# Patient Record
Sex: FEMALE | Race: BLACK OR AFRICAN AMERICAN
[De-identification: names, ages, dates, MRNs, and addresses within clinical notes are randomized per-mention and may not be internally consistent; named-entity substitution may affect disease eponyms.]

---

## 2018-10-09 ENCOUNTER — HOSPITAL ENCOUNTER (EMERGENCY)
Dept: HOSPITAL 74 - JER | Age: 63
LOS: 1 days | Discharge: HOME | End: 2018-10-10
Payer: COMMERCIAL

## 2018-10-09 VITALS — SYSTOLIC BLOOD PRESSURE: 156 MMHG | HEART RATE: 47 BPM | DIASTOLIC BLOOD PRESSURE: 70 MMHG | TEMPERATURE: 98.7 F

## 2018-10-09 VITALS — BODY MASS INDEX: 29.9 KG/M2

## 2018-10-09 DIAGNOSIS — Z87.891: ICD-10-CM

## 2018-10-09 DIAGNOSIS — M25.511: Primary | ICD-10-CM

## 2018-10-09 DIAGNOSIS — Z79.84: ICD-10-CM

## 2018-10-09 DIAGNOSIS — Z95.5: ICD-10-CM

## 2018-10-09 DIAGNOSIS — I10: ICD-10-CM

## 2018-10-09 DIAGNOSIS — I25.10: ICD-10-CM

## 2018-10-09 DIAGNOSIS — E11.9: ICD-10-CM

## 2018-10-09 LAB
ALBUMIN SERPL-MCNC: 3.9 G/DL (ref 3.4–5)
ALP SERPL-CCNC: 84 U/L (ref 45–117)
ALT SERPL-CCNC: 24 U/L (ref 13–61)
ANION GAP SERPL CALC-SCNC: 6 MMOL/L (ref 8–16)
APPEARANCE UR: CLEAR
AST SERPL-CCNC: 16 U/L (ref 15–37)
BACTERIA #/AREA URNS HPF: (no result) /HPF
BASOPHILS # BLD: 0.9 % (ref 0–2)
BILIRUB SERPL-MCNC: 0.4 MG/DL (ref 0.2–1)
BILIRUB UR STRIP.AUTO-MCNC: NEGATIVE MG/DL
BUN SERPL-MCNC: 25 MG/DL (ref 7–18)
CALCIUM SERPL-MCNC: 10.2 MG/DL (ref 8.5–10.1)
CHLORIDE SERPL-SCNC: 108 MMOL/L (ref 98–107)
CO2 SERPL-SCNC: 28 MMOL/L (ref 21–32)
COLOR UR: COLORLESS
CREAT SERPL-MCNC: 1 MG/DL (ref 0.55–1.3)
DEPRECATED RDW RBC AUTO: 14.7 % (ref 11.6–15.6)
EOSINOPHIL # BLD: 3.3 % (ref 0–4.5)
EPITH CASTS URNS QL MICRO: (no result) /HPF
GLUCOSE SERPL-MCNC: 87 MG/DL (ref 74–106)
HCT VFR BLD CALC: 39.5 % (ref 32.4–45.2)
HGB BLD-MCNC: 12.7 GM/DL (ref 10.7–15.3)
KETONES UR QL STRIP: NEGATIVE
LEUKOCYTE ESTERASE UR QL STRIP.AUTO: (no result)
LYMPHOCYTES # BLD: 50.1 % (ref 8–40)
MCH RBC QN AUTO: 26.1 PG (ref 25.7–33.7)
MCHC RBC AUTO-ENTMCNC: 32 G/DL (ref 32–36)
MCV RBC: 81.7 FL (ref 80–96)
MONOCYTES # BLD AUTO: 7.1 % (ref 3.8–10.2)
NEUTROPHILS # BLD: 38.6 % (ref 42.8–82.8)
NITRITE UR QL STRIP: NEGATIVE
PH UR: 6 [PH] (ref 5–8)
PLATELET # BLD AUTO: 185 K/MM3 (ref 134–434)
PMV BLD: 9.2 FL (ref 7.5–11.1)
POTASSIUM SERPLBLD-SCNC: 3.9 MMOL/L (ref 3.5–5.1)
PROT SERPL-MCNC: 7.4 G/DL (ref 6.4–8.2)
PROT UR QL STRIP: NEGATIVE
PROT UR QL STRIP: NEGATIVE
RBC # BLD AUTO: 4.84 M/MM3 (ref 3.6–5.2)
SODIUM SERPL-SCNC: 143 MMOL/L (ref 136–145)
SP GR UR: 1 (ref 1.01–1.03)
UROBILINOGEN UR STRIP-MCNC: NEGATIVE MG/DL (ref 0.2–1)
WBC # BLD AUTO: 5.5 K/MM3 (ref 4–10)

## 2018-10-09 PROCEDURE — 3E033NZ INTRODUCTION OF ANALGESICS, HYPNOTICS, SEDATIVES INTO PERIPHERAL VEIN, PERCUTANEOUS APPROACH: ICD-10-PCS | Performed by: EMERGENCY MEDICINE

## 2018-10-09 NOTE — PDOC
History of Present Illness





- History of Present Illness


Initial Comments: 





10/09/18 20:21


63 year old woman with past medical history of HTN and DM who presents with a 

sharp intermittent pain on the R shoulder radiating to the chest. She describes 

the pain as like "an ice-pick" and is associated with burning and tingling in 

the R shoulder. 


The patient also reports a history of headache that starts at the back of the 

head that radiates to the bilateral temples and is associated runny nose and 

tearing. The headache will come and go and symptoms of runny nose and tearing 

will resolve once the headache remits. The patient states that she has had 

these headaches in the past and has several headaches like this earlier in the 

day but is now resolved. 


The patient denies any recent travel, shortness of breath, chest pain, fevers, 

nausea, vomiting, diarrhea, constipation or any other complaints at bedside. 


She denies changes in vision, ringing in the ears, numbness or tingling in the 

hands or feet. 











<Lona Nichole - Last Filed: 10/09/18 21:54>





<Pedro Luis Garcia - Last Filed: 10/10/18 00:07>





- General


Chief Complaint: Pain


Stated Complaint: PAIN


Time Seen by Provider: 10/09/18 19:03





Past History





- Past Medical History


COPD: No


Diabetes: Yes


HTN: Yes





- Surgical History


Cardiac Surgery: Yes (stent x 2 2008)





- Suicide/Smoking/Psychosocial Hx


Smoking History: Former smoker


Have you smoked in the past 12 months: No


If you are a former smoker, when did you quit?: 15YRS AGO


Information on smoking cessation initiated: No


Hx Alcohol Use: No


Drug/Substance Use Hx: No


Substance Use Type: None





<Lona Nichole - Last Filed: 10/09/18 21:54>





<Pedro Luis Garcia - Last Filed: 10/10/18 00:07>





- Past Medical History


Allergies/Adverse Reactions: 


 Allergies











Allergy/AdvReac Type Severity Reaction Status Date / Time


 


No Known Drug Allergies Allergy   Verified 10/09/18 18:56











Home Medications: 


Ambulatory Orders





Aspirin [Aspirin EC] 325 mg PO DAILY 10/22/15 


Atenolol [Tenormin] 50 mg PO BID 10/22/15 


Gabapentin [Neurontin -] 300 mg PO Q8H 10/22/15 


Glyburide 5 mg PO BID 10/22/15 


Lisinopril [Prinivil -] 40 mg PO DAILY 10/22/15 


Metformin HCl [Glucophage] 1,000 mg PO BID 10/22/15 


Simvastatin 10 mg PO DAILY 10/22/15 


Cephalexin Monohydrate [Keflex -] 500 mg PO Q6H #7 capsule 10/09/18 











*Physical Exam





- Vital Signs


 Last Vital Signs











Temp Pulse Resp BP Pulse Ox


 


 98.7 F   47 L  18   156/70   96 


 


 10/09/18 18:56  10/09/18 18:56  10/09/18 18:56  10/09/18 18:56  10/09/18 18:56














- Physical Exam


Comments: 





10/09/18 20:38





+ R CVA tenderness 





point tenderness to medial border of R scapula on palpation





no spinal tenderness to palpation











<Lona Nichole - Last Filed: 10/09/18 21:54>





- Vital Signs


 Last Vital Signs











Temp Pulse Resp BP Pulse Ox


 


 98.7 F   47 L  18   156/70   96 


 


 10/09/18 18:56  10/09/18 18:56  10/09/18 18:56  10/09/18 18:56  10/09/18 18:56














<Pedro Luis Garcia - Last Filed: 10/10/18 00:07>





ED Treatment Course





- LABORATORY


CBC & Chemistry Diagram: 


 10/09/18 21:06





 10/09/18 21:06





<Lona Nichole - Last Filed: 10/09/18 21:54>





- LABORATORY


CBC & Chemistry Diagram: 


 10/09/18 21:06





 10/09/18 21:06





- ADDITIONAL ORDERS


Additional order review: 


 Laboratory  Results











  10/09/18 10/09/18 10/09/18





  21:06 21:06 21:06


 


Sodium  143  


 


Potassium  3.9  


 


Chloride  108 H  


 


Carbon Dioxide  28  


 


Anion Gap  6 L  


 


BUN  25 H  


 


Creatinine  1.0  


 


Creat Clearance w eGFR  55.64  


 


Random Glucose  87  


 


Calcium  10.2 H  


 


Total Bilirubin  0.4  


 


AST  16  


 


ALT  24  


 


Alkaline Phosphatase  84  


 


Troponin I    < 0.02


 


Total Protein  7.4  


 


Albumin  3.9  


 


Urine Color   Colorless 


 


Urine Appearance   Clear 


 


Urine pH   6.0 


 


Ur Specific Gravity   1.003 L 


 


Urine Protein   Negative 


 


Urine Glucose (UA)   Negative 


 


Urine Ketones   Negative 


 


Urine Blood   1+ H 


 


Urine Nitrite   Negative 


 


Urine Bilirubin   Negative 


 


Urine Urobilinogen   Negative 


 


Ur Leukocyte Esterase   2+ H 


 


Urine WBC (Auto)   21 


 


Urine RBC (Auto)   None 


 


Ur Epithelial Cells   Rare 


 


Urine Bacteria   Rare 








 











  10/09/18





  21:06


 


RBC  4.84


 


MCV  81.7


 


MCHC  32.0


 


RDW  14.7


 


MPV  9.2


 


Neutrophils %  38.6 L


 


Lymphocytes %  50.1 H


 


Monocytes %  7.1


 


Eosinophils %  3.3


 


Basophils %  0.9














- RADIOLOGY


Radiology Studies Ordered: 














 Category Date Time Status


 


 CHEST PA & LAT [RAD] Stat Radiology  10/09/18 20:27 Taken














- Medications


Given in the ED: 


ED Medications














Discontinued Medications














Generic Name Dose Route Start Last Admin





  Trade Name Harjinder  PRN Reason Stop Dose Admin


 


Acetaminophen  1,000 mg  10/09/18 20:28  10/09/18 21:13





  Ofirmev Injection -  IVPB  10/09/18 20:29  1,000 mg





  ONCE ONE   Administration





     





     





     





     


 


Cephalexin HCl  500 mg  10/09/18 21:41  10/09/18 22:00





  Keflex -  PO  10/09/18 21:42  500 mg





  ONCE ONE   Administration





     





     





     





     














<Pedro Luis Garcia - Last Filed: 10/10/18 00:07>





Medical Decision Making





- Medical Decision Making





10/09/18 21:05


63 year old woman with past medical history of HTN and DM who presents with a 

sharp intermittent pain on the R shoulder radiating to the chest. She describes 

the pain as like "an ice-pick" and is associated with burning and tingling in 

the R shoulder. 


The patient also reports a history of headache that starts at the back of the 

head that radiates to the bilateral temples and is associated runny nose and 

tearing. The headache will come and go and symptoms of runny nose and tearing 

will resolve once the headache remits. The patient states that she has had 

these headaches in the past and has several headaches like this earlier in the 

day but is now resolved. 





DDX including but not limited to: musculoskeletal vs referred cholesysitic pain 

vs pleuritic pain vs radiculopathy





W/U: 


- cbc, cmp


- cxr


- ua, ucx





TX: 


- tylenol





ED Course: 


Patient stable in no acute distress, resting comfortably.





10/09/18 21:54


UA: 21 wbc, 2+ leuk esterase


Keflex 500 given 





10/09/18 21:55


Patient signed out to Dr. Garcia.








<Lona Nichole - Last Filed: 10/09/18 21:54>





*DC/Admit/Observation/Transfer





- Discharge Dispostion


Decision to Admit order: No





<Lona Nichole - Last Filed: 10/09/18 21:54>





<Pedro Luis Garcia - Last Filed: 10/10/18 00:07>


Diagnosis at time of Disposition: 


 Shoulder pain








- Discharge Dispostion


Disposition: HOME


Condition at time of disposition: Stable





- Prescriptions


Prescriptions: 


Cephalexin Monohydrate [Keflex -] 500 mg PO Q6H #7 capsule





- Referrals


Referrals: 


Kathia Galicia [Primary Care Provider] - 


Mahamed Falcon MD [Staff Physician] - 





- Patient Instructions


Additional Instructions: 


You were seen in the ED for complaints of R shoulder/upper back pain.





In the ED you were evaluated with labwork and imaging.


Your results showed a urinary tract infection but no other significant 

findings. 


There does not appear to be an immediate need for hospitalization.





You are advised to follow up with your primary care physician within 1 week.


You were given a referral to orthopedics. Call the number provided to make an 

appointment within 1 week for further evaluation of your neck and shoulder pain.


You were given a prescription for antibiotics to treat your urinary tract 

infection. Please take medications as directed.





Return to the ED immediately if you experience worsening neck and shoulder pain

, nausea, vomiting, loss of consciousness, fever, chest pain, shortness of 

breath or numbness or tingling down the arm.








- Post Discharge Activity

## 2018-10-09 NOTE — PDOC
Attending Attestation





- Resident


Resident Name: Lona Nichole





- ED Attending Attestation


I have performed the following: I have examined & evaluated the patient, The 

case was reviewed & discussed with the resident, I agree w/resident's findings 

& plan, Exceptions are as noted





- HPI


HPI: 





10/09/18 21:03


The patient is a 63 year old female, with a past medical history of DM and HTN, 

who presents to the emergency department via EMS with neck and shoulder pain. 

Pt states that she has had neck pain for approx 1 month. Denies any trauma or 

falls. Pt states that she presents today because she developed pain in her R 

upper back. It is worse with palpation, not pleuritic, not exertional. Pt also 

endorses pain in her R shoulder. Denies CP/SOB. Denies abdominal pain.





She denies any recent palpitations or chest pain. She denies recent fevers, 

chills, lightheadedness or dizziness. She denies recent nausea, vomit, diarrhea 

or constipation. She denies recent  dysuria, frequency, urgency or hematuria. 





Allergies: NKA 


Past surgical history: None reported.


Social history: Former smoker. Denies EtOH use and recreational drug use. 


Primary Care Physician: Dr. Galicia 








- Physicial Exam


PE: 





10/09/18 21:09


GENERAL: Awake, alert, and fully oriented, in no acute distress.


HEAD: No signs of trauma


EYES: PERRLA, EOMI, sclera anicteric, conjunctiva clear


ENT: Auricles normal inspection, hearing grossly normal, nares patent, 

oropharynx clear without exudates. Moist mucosa


NECK: + Mild R paraspinal TTP, no stepoffs, Normal ROM, supple, no 

lymphadenopathy, JVD, or masses


LUNGS: Breath sounds equal, clear to auscultation bilaterally.  No wheezes, and 

no crackles


HEART: Regular rate and rhythm, normal S1 and S2, no murmurs, rubs or gallops


ABDOMEN: Soft, nontender, normoactive bowel sounds.  No guarding, no rebound.  

No masses


EXTREMITIES: Normal range of motion, no edema.  No clubbing or cyanosis. No 

cords, erythema, or tenderness


NEUROLOGICAL: Cranial nerves II through XII intact. 5/5 strength and sensation 

in all extremities, Normal speech, normal gait, normal cerebellar function


SKIN: Warm, Dry, normal turgor, no rashes or lesions noted.


BACK: + Mild TTP over R scapula, no deformity





- Medical Decision Making





10/09/18 21:11


63 F with R upper back and neck pain. Suspect cervical radiculopathy. Pt with 

no neuro deficits to suggest spinal cord injury. Pt with no pleuritic nature of 

pain to suggest lung pathology. Pain is reproducible with palpation, making msk 

pain more likely. Could also be referred pain, but pt with nontender abdomen.


- Labs, trop


- EKG


- CXR


- Pain control





10/10/18 00:06


Labs wnl


CXR clear on my read


UA notable for UTI.





Pt is well appearing, with normal vitals. Clinically stable for DC at this time.


I discussed the physical exam findings, ancillary test results and final 

diagnoses with the patient. I answered all of the patient's questions. The 

patient was satisfied with the care received and felt comfortable with the 

discharge plan and treatment plan.  The patient agrees to follow up with the 

primary care physician within 24-72 hours.

## 2018-10-10 NOTE — EKG
Test Reason : 

Blood Pressure : ***/*** mmHG

Vent. Rate : 043 BPM     Atrial Rate : 043 BPM

   P-R Int : 180 ms          QRS Dur : 088 ms

    QT Int : 462 ms       P-R-T Axes : 049 035 063 degrees

   QTc Int : 390 ms

 

MARKED SINUS BRADYCARDIA

ABNORMAL ECG

WHEN COMPARED WITH ECG OF 26-SEP-2010 12:04,

NO SIGNIFICANT CHANGE WAS FOUND

Confirmed by XAVIER UMAÑA MD ([id number]) on 10/10/2018 9:54:35 AM

 

Referred By:             Confirmed By:XAVIER UMAÑA MD

## 2020-01-30 ENCOUNTER — HOSPITAL ENCOUNTER (EMERGENCY)
Dept: HOSPITAL 74 - JERFT | Age: 65
Discharge: HOME | End: 2020-01-30
Payer: COMMERCIAL

## 2020-01-30 VITALS — DIASTOLIC BLOOD PRESSURE: 61 MMHG | TEMPERATURE: 97.9 F | HEART RATE: 58 BPM | SYSTOLIC BLOOD PRESSURE: 100 MMHG

## 2020-01-30 VITALS — BODY MASS INDEX: 23.3 KG/M2

## 2020-01-30 DIAGNOSIS — G25.81: ICD-10-CM

## 2020-01-30 DIAGNOSIS — M54.32: ICD-10-CM

## 2020-01-30 DIAGNOSIS — E11.9: ICD-10-CM

## 2020-01-30 DIAGNOSIS — Z95.5: ICD-10-CM

## 2020-01-30 DIAGNOSIS — I25.10: Primary | ICD-10-CM

## 2020-01-30 DIAGNOSIS — Z79.84: ICD-10-CM

## 2020-01-30 DIAGNOSIS — Z87.891: ICD-10-CM

## 2020-01-30 DIAGNOSIS — I10: ICD-10-CM

## 2020-01-30 PROCEDURE — 3E0233Z INTRODUCTION OF ANTI-INFLAMMATORY INTO MUSCLE, PERCUTANEOUS APPROACH: ICD-10-PCS | Performed by: STUDENT IN AN ORGANIZED HEALTH CARE EDUCATION/TRAINING PROGRAM

## 2020-01-30 NOTE — PDOC
History of Present Illness





- General


Chief Complaint: Pain


Stated Complaint: SEVER LF HIP PAIN


Time Seen by Provider: 01/30/20 12:14


History Source: Patient


Exam Limitations: No Limitations





Past History





- Past Medical History


Allergies/Adverse Reactions: 


 Allergies











Allergy/AdvReac Type Severity Reaction Status Date / Time


 


No Known Drug Allergies Allergy   Verified 10/09/18 18:56











Home Medications: 


Ambulatory Orders





Aspirin [Aspirin EC] 325 mg PO DAILY 10/22/15 


Atenolol [Tenormin] 25 mg PO BID 10/22/15 


Lisinopril [Prinivil -] 20 mg PO DAILY 10/22/15 


Metformin HCl [Glucophage] 1,000 mg PO BID 10/22/15 


Amlodipine Besylate [Norvasc -] 10 mg PO DAILY 01/30/20 


Atorvastatin Calcium [Lipitor] 20 mg PO HS 01/30/20 


Diclofenac Sodium [Voltaren] 4 gm TP Q6H #2 gel..gram. 01/30/20 


Ergocalciferol (Vitamin D2) [Vitamin D2] 1,000 unit PO DAILY 01/30/20 


Hydrochlorothiazide [Hctz -] 25 mg PO DAILY 01/30/20 


Methocarbamol [Robaxin -] 1,500 mg PO QID #24 tablet 01/30/20 


Oxycodone HCl/Acetaminophen [Percocet 5-325 mg Tablet] 1 tab PO Q6H PRN #12 tab 

MDD 4 01/30/20 


Vitamin B12 100 mg PO DAILY 01/30/20 








COPD: No


Diabetes: Yes


HTN: Yes





- Surgical History


Cardiac Surgery: Yes (stent x 2 2008)





- Psycho Social/Smoking Cessation Hx


Smoking History: Never smoked


Have you smoked in the past 12 months: No


If you are a former smoker, when did you quit?: 15YRS AGO


Hx Alcohol Use: No


Drug/Substance Use Hx: No


Substance Use Type: None





*Physical Exam





- Vital Signs


 Last Vital Signs











Temp Pulse Resp BP Pulse Ox


 


 97.9 F   58 L  20   100/61   99 


 


 01/30/20 12:11  01/30/20 12:11  01/30/20 12:11  01/30/20 12:11  01/30/20 12:11














- Physical Exam


General Appearance: Yes: Mild Distress (due to pain).  No: Apparent Distress


Respiratory/Chest: positive: Lungs Clear, Normal Breath Sounds.  negative: 

Respiratory Distress


Cardiovascular: positive: Regular Rhythm, Regular Rate, S1, S2.  negative: 

Murmur


Gastrointestinal/Abdominal: positive: Soft.  negative: Tender


Musculoskeletal: negative: Vertebral Tenderness


Extremity: positive: Other (able to move LLE, has more pain when in seated 

position)


Neurologic: positive: Alert, Normal Mood/Affect





Medical Decision Making





- Medical Decision Making








65 y/o F hx of HTN, DM, herniated disc, sciatica, restless leg syndrome 

presents with L buttock pain radiating down leg x 4 days, worse today. Last MRI 

of spine was several years ago. 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 

move very heavy patient. Denies fever, sob, cp, abd pain, n/v, back pain, 

urinary sxs, numbness/tingling of extremities, bowel/bladder incontinence, 

saddle/groin paresthesia. 





Likely sciatica


Plan: IM toradol, lido patch, robaxin





01/30/20 12:30





Was still in pain so given Percocet


Advised f/u with PCP





01/30/20 13:37








Discharge





- Discharge Information


Problems reviewed: Yes


Clinical Impression/Diagnosis: 


Sciatica


Qualifiers:


 Laterality: left Qualified Code(s): M54.32 - Sciatica, left side





Condition: Stable


Disposition: HOME





- Admission


No





- Additional Discharge Information


Prescriptions: 


Diclofenac Sodium [Voltaren] 4 gm TP Q6H #2 gel..gram.


Methocarbamol [Robaxin -] 1,500 mg PO QID #24 tablet


Oxycodone HCl/Acetaminophen [Percocet 5-325 mg Tablet] 1 tab PO Q6H PRN #12 tab 

MDD 4


 PRN Reason: Severe Pain


Prescription Drug Monitoring Program (I-STOP) results: I-STOP not reviewed





- Follow up/Referral


Referrals: 


Mariana Catalan MD [Primary Care Provider] - 





- Patient Discharge Instructions


Patient Printed Discharge Instructions:  DI for Sciatica


Additional Instructions: 


Thank you for choosing [facility].  It was a pleasure taking 

care of you.  





Alternate between Tylenol 650 mg every 4 and Motrin 600 mg every 6 hours as 

needed for pain. Be cautious with Motrin as it can affect kidney function. 


Use Robaxin as well. This medication can make you drowsy.


For severe pain, you may take Percocet.  This medication can make you 

constipated for which you may take over the counter Senna tablets as needed.  

This medication can also make you drowsy so please be cautious with driving or 

performing heavy physical work. Please be aware that Percocet contains Tylenol. 

Do not take more than 4000 mg of Tylenol in 1 day.


Use Voltaren gel as directed


Heating pads may also help


Follow-up with your doctor in 2 days





Return to the Emergency Department if your symptoms worsen or persist or have 

chest pain, abdominal pain, vomiting, weakness of extremities, unable to walk, 

unable to control bowel or bladder movements  other concerning symptoms. 





- Post Discharge Activity
3-5x/week

## 2020-07-10 ENCOUNTER — HOSPITAL ENCOUNTER (INPATIENT)
Dept: HOSPITAL 74 - JER | Age: 65
LOS: 8 days | Discharge: HOME HEALTH | DRG: 388 | End: 2020-07-18
Attending: GENERAL ACUTE CARE HOSPITAL | Admitting: INTERNAL MEDICINE
Payer: COMMERCIAL

## 2020-07-10 VITALS — BODY MASS INDEX: 26.9 KG/M2

## 2020-07-10 DIAGNOSIS — I21.4: ICD-10-CM

## 2020-07-10 DIAGNOSIS — I25.10: ICD-10-CM

## 2020-07-10 DIAGNOSIS — E87.1: ICD-10-CM

## 2020-07-10 DIAGNOSIS — E11.9: ICD-10-CM

## 2020-07-10 DIAGNOSIS — Z79.84: ICD-10-CM

## 2020-07-10 DIAGNOSIS — E83.42: ICD-10-CM

## 2020-07-10 DIAGNOSIS — R31.9: ICD-10-CM

## 2020-07-10 DIAGNOSIS — E87.2: ICD-10-CM

## 2020-07-10 DIAGNOSIS — D62: ICD-10-CM

## 2020-07-10 DIAGNOSIS — Z95.5: ICD-10-CM

## 2020-07-10 DIAGNOSIS — G25.81: ICD-10-CM

## 2020-07-10 DIAGNOSIS — M54.30: ICD-10-CM

## 2020-07-10 DIAGNOSIS — E83.39: ICD-10-CM

## 2020-07-10 DIAGNOSIS — K56.50: Primary | ICD-10-CM

## 2020-07-10 DIAGNOSIS — E87.6: ICD-10-CM

## 2020-07-10 DIAGNOSIS — I10: ICD-10-CM

## 2020-07-10 DIAGNOSIS — R80.9: ICD-10-CM

## 2020-07-10 LAB
ALBUMIN SERPL-MCNC: 4.7 G/DL (ref 3.4–5)
ALP SERPL-CCNC: 48 U/L (ref 45–117)
ALT SERPL-CCNC: 19 U/L (ref 13–61)
ANION GAP SERPL CALC-SCNC: 10 MMOL/L (ref 8–16)
APTT BLD: 29.7 SECONDS (ref 25.2–36.5)
AST SERPL-CCNC: 20 U/L (ref 15–37)
BASOPHILS # BLD: 0.5 % (ref 0–2)
BILIRUB SERPL-MCNC: 0.6 MG/DL (ref 0.2–1)
BUN SERPL-MCNC: 16.9 MG/DL (ref 7–18)
CALCIUM SERPL-MCNC: 10.6 MG/DL (ref 8.5–10.1)
CHLORIDE SERPL-SCNC: 89 MMOL/L (ref 98–107)
CO2 SERPL-SCNC: 32 MMOL/L (ref 21–32)
CREAT SERPL-MCNC: 1 MG/DL (ref 0.55–1.3)
DEPRECATED RDW RBC AUTO: 14.1 % (ref 11.6–15.6)
EOSINOPHIL # BLD: 0 % (ref 0–4.5)
GLUCOSE SERPL-MCNC: 208 MG/DL (ref 74–106)
HCT VFR BLD CALC: 41.7 % (ref 32.4–45.2)
HGB BLD-MCNC: 13.6 GM/DL (ref 10.7–15.3)
INR BLD: 1.11 (ref 0.83–1.09)
LIPASE SERPL-CCNC: 90 U/L (ref 73–393)
LYMPHOCYTES # BLD: 16.2 % (ref 8–40)
MAGNESIUM SERPL-MCNC: 1.5 MG/DL (ref 1.8–2.4)
MCH RBC QN AUTO: 26.6 PG (ref 25.7–33.7)
MCHC RBC AUTO-ENTMCNC: 32.6 G/DL (ref 32–36)
MCV RBC: 81.5 FL (ref 80–96)
MONOCYTES # BLD AUTO: 4.3 % (ref 3.8–10.2)
NEUTROPHILS # BLD: 79 % (ref 42.8–82.8)
PLATELET # BLD AUTO: 278 K/MM3 (ref 134–434)
PMV BLD: 8.6 FL (ref 7.5–11.1)
POTASSIUM SERPLBLD-SCNC: 3.9 MMOL/L (ref 3.5–5.1)
PROT SERPL-MCNC: 8.3 G/DL (ref 6.4–8.2)
PT PNL PPP: 13.1 SEC (ref 9.7–13)
RBC # BLD AUTO: 5.11 M/MM3 (ref 3.6–5.2)
SODIUM SERPL-SCNC: 130 MMOL/L (ref 136–145)
WBC # BLD AUTO: 9.6 K/MM3 (ref 4–10)

## 2020-07-10 PROCEDURE — U0003 INFECTIOUS AGENT DETECTION BY NUCLEIC ACID (DNA OR RNA); SEVERE ACUTE RESPIRATORY SYNDROME CORONAVIRUS 2 (SARS-COV-2) (CORONAVIRUS DISEASE [COVID-19]), AMPLIFIED PROBE TECHNIQUE, MAKING USE OF HIGH THROUGHPUT TECHNOLOGIES AS DESCRIBED BY CMS-2020-01-R: HCPCS

## 2020-07-10 NOTE — PDOC
Documentation entered by Remedios Orozco SCRIBE, acting as scribe for Lona Batista MD.








Lona aBtista MD:  This documentation has been prepared by the scribe, 

Remedios Orozco SCRIBE, under my direction and personally reviewed by me in its 

entirety.  I confirm that the documentation accurately reflects all work, 

treatment, procedures, and medical decision making performed by me.  





Attending Attestation





- Resident


Resident Name: Baljinder Martinez





- ED Attending Attestation


I have performed the following: I have examined & evaluated the patient, The 

case was reviewed & discussed with the resident, I agree w/resident's findings &

plan, Exceptions are as noted





- HPI


HPI: 





07/10/20 23:22


Patient is a 65 year old female with a significant past medical history of C 

section, EX-Lap surgery, obstruction, DM and HTN who presents to the ED with 

vomiting and abdominal pain since yesterday. Patient said she has had symptoms 

of nausea and vomiting along with not being able to intake PO. Patient stated 

she has not been passing gas and her ast bowel movement was this morning - 

small.





Allergies: NKDA





PCP: Dr. Mariana Catalan





07/12/20 13:02








- Physicial Exam


PE: 





07/10/20 23:22


General:   Well appearing, awake and alert, NAD. 


HEENT:  NCAT, PERRL, EOMI, clear conjunctiva, anicteric, moist mucus membranes, 

clear oropharynx, no oral lesions.. 


Neck:  neck supple, FROM


Resp:  CTAB, normal and even respirations, no respiratory distress


CVS: RRR, no murmurs, 2+ peripheral pulses throughout, no peripheral edema


Abdomen: +Mid to briseida umbilical and lower abdominal tenderness. +Midline 

vertical laparotomy scar. Soft, NTND, no rebound or guarding. No CVAT.


Back: nontender, normal inspection and ROM


MSK:  no edema, BRIGHT x4, ROM intact. No clubbing or cyanosis. normal bulk and 

tone.


Neuro: alert, oriented appropriately; no focal neurologic deficits


Psych: Calm and cooperative


Skin: warm and well perfused, cap refill <2 sec, normal color





07/12/20 13:02








- Medical Decision Making





07/10/20 23:39


Vital Signs











Temp Pulse Resp BP Pulse Ox


 


 99.9 F H  63   18   184/80 H  97 


 


 07/10/20 20:55  07/10/20 20:55  07/10/20 20:55  07/10/20 20:55  07/10/20 20:55








DDx abdominal pain:  Renal colic, biliary colic, metabolic/electrolyte 

derangements. GERD, PUD, esophageal spasm, pancreatitis, hepatitis, 

constipation, colitis, gastroenteritis, cholecystitis, UTI, pyelonephritis, 

ileus, SBO, medication side effect, hernia, appendicitis, diverticulitis, 

mesenteric ischemia.  msk strain, mesenteric adenitis, psoas abscess.








07/10/20 23:40


Vitals reviewed, low-grade temperature and hypertensive


No tachycardia.  No chest pain or shortness of breath.


ECG is sinus rhythm without ischemic changes, bedside point-of-care ultrasound 

was performed given her abdominal pain, normal gallbladder no stones and no 

evidence of cholecystitis.  Normal kidneys, no hydronephrosis.  However there 

are multiple loops of dilatation, measuring 3 to 4 cm, with to and fro sign, no 

peristalsis and scant free fluid between the bowel walls.  Highly suspicious for

SBO given her history of ex lap and obstruction previously.


CT abdomen pelvis will be obtained to further elucidate.  NG tube, n.p.o., IV 

fluids.


Will need surgical consultation


Basic labs reveals lactic acidosis 2.5, normal creatinine electrolytes.  Will 

hydrate and recheck lactic


Low magnesium, will replete appropriately with 2 g IV mag


Elevated troponin 1.1, no chest pain, ECG is sinus rhythm without ischemic 

changes.  Will need repeat but this is most likely demand ischemia in the s

etting of SBO clinically.


Will warrant admission for medical and surgical management


07/12/20 13:02








**Heart Score/ECG Review


  ** #1


ECG reviewed & interpreted by me at: 21:45


General ECG Interpretation: Sinus Rhythm, Normal Rate, Normal Intervals





07/10/20 23:39


EKG normal sinus rhythm 63 bpm, no interval abnormalities, narrow QRS, ST and T 

wave segments and morphology normal





Discharge





- Discharge Information


Problems reviewed: Yes


Clinical Impression/Diagnosis: 


 Small bowel obstruction, NSTEMI (non-ST elevated myocardial infarction), Lactic

acidosis





Condition: Fair





- Admission


Yes





- Follow up/Referral





- Patient Discharge Instructions





- Post Discharge Activity

## 2020-07-10 NOTE — PDOC
History of Present Illness





- General


Chief Complaint: Pain


Stated Complaint: ABD PAIN/ VOMITTING


Time Seen by Provider: 07/10/20 21:10





- History of Present Illness


Initial Comments: 





07/10/20 22:37


HPI: 


66 y/o with hx of HTN, DM, herniated disc, sciatica, restless leg syndrome, 

exlap for SBO presenting with generalized abd pain since yesterday associated 

with emesis. Pain was sudden and intermittent and felt squeezing. Pain improved 

and then returned with most pain around umbilicus. No rad to flank or back. 

Today she noted worse nausea and had 5episodes of emesis. Was not able to 

tolerate food or liquid. Reports passing gas and stool. No fever, chills, chest 

pain, SOB, palp, dysuria





PMHx: as noted above


ROS: as noted


SHx: Denies tobacco use; no alcohol use; no rec drugs


Allergies: NKDA








ROS: 


GENERAL/CONSTITUTIONAL: No fever or chills. No weakness.


HEAD, EYES, EARS, NOSE AND THROAT: No change in vision. No ear pain or 

discharge. No sore throat.


CARDIOVASCULAR: No chest pain or shortness of breath


RESPIRATORY: No cough, wheezing, or hemoptysis.


GASTROINTESTINAL: +nausea, vomiting; no diarrhea or constipation.


GENITOURINARY: No dysuria, frequency, or change in urination.


MUSCULOSKELETAL: No joint or muscle swelling or pain. No neck or back pain.


SKIN: No rash


NEUROLOGIC: No headache, vertigo, loss of consciousness, or change in 

strength/sensation.


ENDOCRINE: No increased thirst. No abnormal weight change


HEMATOLOGIC/LYMPHATIC: No anemia, easy bleeding, or history of blood clots.


ALLERGIC/IMMUNOLOGIC: No hives or skin allergy.








PE: 


GENERAL: Awake, alert, and fully oriented, no acute distress


HEAD: No signs of trauma, normocephalic, atraumatic 


EYES: EOMI, sclera anicteric, conjunctiva clear


ENT: Auricles normal inspection, hearing grossly normal, nares patent, 

oropharynx clear without exudates. Moist mucosa


NECK: Normal ROM, no lymphadenopathy


LUNGS: No increased work of breathing, symmetrical chest rise, clear to 

auscultation bilaterally, no wheezes, crackles or rhonchi 


HEART: Regular rate, regular rhythm, normal S1 and S2, no murmur, peripheral 

pulses 2+ and equal bilaterally. 


ABDOMEN: Soft, nondistended, periumbilical and RLQ ttp with guarding, no 

rebound. No masses. No CVAT


MUSCULOSKELETAL: FROM


NEUROLOGICAL: Cranial nerves II through XII grossly intact. Normal speech, 

stable gait, no focal sensorimotor deficits 


SKIN: Warm, Dry, normal turgor, no rashes or lesions noted








Past History





- Medical History


Allergies/Adverse Reactions: 


                                    Allergies











Allergy/AdvReac Type Severity Reaction Status Date / Time


 


No Known Drug Allergies Allergy   Verified 07/10/20 20:58











Home Medications: 


Ambulatory Orders





Aspirin [Aspirin EC] 325 mg PO DAILY 10/22/15 


Atenolol [Tenormin] 25 mg PO BID 10/22/15 


Lisinopril [Prinivil -] 20 mg PO DAILY 10/22/15 


Metformin HCl [Glucophage] 500 mg PO BID 10/22/15 


Amlodipine Besylate [Norvasc -] 10 mg PO DAILY 01/30/20 


Atorvastatin Calcium [Lipitor] 40 mg PO HS 01/30/20 


Ergocalciferol (Vitamin D2) [Vitamin D2] 1,000 unit PO DAILY 01/30/20 


Vitamin B12 100 mg PO DAILY 01/30/20 


Glimepiride 1 mg PO DAILY 07/11/20 


Naproxen 500 mg PO Q12H PRN 07/11/20 








COPD: No


Diabetes: Yes


HTN: Yes


Other medical history: herniated discs





- Surgical History


Cardiac Surgery: Yes (stent x 2 2008)





- Psycho-Social/Smoking History


Smoking History: Never smoked


Have you smoked in the past 12 months: No


If you are a former smoker, when did you quit?: 15YRS AGO





- Substance Abuse Hx (Audit-C & DAST Scrn)


How often the patient has a drink containing alcohol: Never


Score: In Men: 4 or > Positive; In Women: 3 or > Positive: 0


Screen Result (Pos requires Nsg. Audit-10AR): Negative





*Physical Exam





- Vital Signs


                                Last Vital Signs











Temp Pulse Resp BP Pulse Ox


 


 99.9 F H  63   18   184/80 H  97 


 


 07/10/20 20:55  07/10/20 20:55  07/10/20 20:55  07/10/20 20:55  07/10/20 20:55














ED Treatment Course





- LABORATORY


CBC & Chemistry Diagram: 


                                 07/10/20 22:23





                                 07/10/20 22:23





- RADIOLOGY


Radiology Studies Ordered: 














 Category Date Time Status


 


 ABDOMEN & PELVIS CT WITH CONTR [CT] Stat CT Scan  07/10/20 21:25 Ordered














- Medications


Given in the ED: 


ED Medications














Discontinued Medications














Generic Name Dose Route Start Last Admin





  Trade Name Harjinder  PRN Reason Stop Dose Admin


 


Al Hydroxide/Mg Hydroxide  30 ml  07/10/20 21:25  07/10/20 22:22





  Mylanta Oral Suspension -  PO  07/10/20 21:26  Not Given





  ONCE ONE  














Medical Decision Making





- Medical Decision Making





07/11/20 05:40


66 y/o with hx of HTN, DM, herniated disc, sciatica, restless leg syndrome, 

exlap for SBO presenting with generalized abd pain since yesterday associated 

with emesis. T 99.9; PE with generalized abd ttp worse in RLQ. DDx includes 

gastritis, GE, appy, diverticulitis, sbo.





-cbc, cmp, coags, card prof, coags, t&s, ekg, cxr


-ct abd pel with contrast


-ivf, ofirmev, pepcid, zofran





07/11/20 05:42


POCUS with dilated loops of bowel while attempting to visualzie gallbladder and 

kidneys greater than 3-4cm concerning for sbo


CT with evidence of SBO


patient remains stable with minimal complaints; given 4mg morphine and placed NG

 tube with 250cc suctioned


lactate 2.5


trop 1.2


MBMD sent to Dr Avalos


admitted to tele under dr grijalva





07/11/20 05:44


patient remains stable with minimal complaints of pain. no nausea or emesis. 

improvement following NG palcement








Discharge





- Discharge Information


Problems reviewed: Yes


Clinical Impression/Diagnosis: 


 Small bowel obstruction, NSTEMI (non-ST elevated myocardial infarction), Lactic

 acidosis





Condition: Fair





- Admission


Yes





- Follow up/Referral





- Patient Discharge Instructions





- Post Discharge Activity

## 2020-07-11 LAB
ALBUMIN SERPL-MCNC: 3.9 G/DL (ref 3.4–5)
ALP SERPL-CCNC: 41 U/L (ref 45–117)
ALT SERPL-CCNC: 15 U/L (ref 13–61)
ANION GAP SERPL CALC-SCNC: 8 MMOL/L (ref 8–16)
APPEARANCE UR: CLEAR
AST SERPL-CCNC: 19 U/L (ref 15–37)
BACTERIA # UR AUTO: 23 /UL (ref 0–1359)
BILIRUB SERPL-MCNC: 0.5 MG/DL (ref 0.2–1)
BILIRUB UR STRIP.AUTO-MCNC: NEGATIVE MG/DL
BUN SERPL-MCNC: 15.5 MG/DL (ref 7–18)
CALCIUM SERPL-MCNC: 9.5 MG/DL (ref 8.5–10.1)
CASTS URNS QL MICRO: 1 /UL (ref 0–3.1)
CHLORIDE SERPL-SCNC: 94 MMOL/L (ref 98–107)
CO2 SERPL-SCNC: 30 MMOL/L (ref 21–32)
COLOR UR: YELLOW
CREAT SERPL-MCNC: 1 MG/DL (ref 0.55–1.3)
DEPRECATED RDW RBC AUTO: 13.9 % (ref 11.6–15.6)
EPITH CASTS URNS QL MICRO: 7 /UL (ref 0–25.1)
GLUCOSE SERPL-MCNC: 165 MG/DL (ref 74–106)
HCT VFR BLD CALC: 38.3 % (ref 32.4–45.2)
HGB BLD-MCNC: 12.3 GM/DL (ref 10.7–15.3)
KETONES UR QL STRIP: NEGATIVE
LEUKOCYTE ESTERASE UR QL STRIP.AUTO: NEGATIVE
MAGNESIUM SERPL-MCNC: 2.1 MG/DL (ref 1.8–2.4)
MCH RBC QN AUTO: 26.4 PG (ref 25.7–33.7)
MCHC RBC AUTO-ENTMCNC: 32.2 G/DL (ref 32–36)
MCV RBC: 81.8 FL (ref 80–96)
NITRITE UR QL STRIP: NEGATIVE
PH UR: 6.5 [PH] (ref 5–8)
PHOSPHATE SERPL-MCNC: 3.5 MG/DL (ref 2.5–4.9)
PLATELET # BLD AUTO: 255 K/MM3 (ref 134–434)
PMV BLD: 8.4 FL (ref 7.5–11.1)
POTASSIUM SERPLBLD-SCNC: 3.9 MMOL/L (ref 3.5–5.1)
PROT SERPL-MCNC: 6.9 G/DL (ref 6.4–8.2)
PROT UR QL STRIP: (no result)
PROT UR QL STRIP: (no result)
RBC # BLD AUTO: 22 /UL (ref 0–23.9)
RBC # BLD AUTO: 4.68 M/MM3 (ref 3.6–5.2)
SODIUM SERPL-SCNC: 132 MMOL/L (ref 136–145)
SP GR UR: 1.02 (ref 1.01–1.03)
UROBILINOGEN UR STRIP-MCNC: 0.2 MG/DL (ref 0.2–1)
WBC # BLD AUTO: 7.7 K/MM3 (ref 4–10)
WBC # UR AUTO: 4 /UL (ref 0–25.8)

## 2020-07-11 PROCEDURE — 0D9670Z DRAINAGE OF STOMACH WITH DRAINAGE DEVICE, VIA NATURAL OR ARTIFICIAL OPENING: ICD-10-PCS | Performed by: GENERAL ACUTE CARE HOSPITAL

## 2020-07-11 RX ADMIN — INSULIN ASPART SCH: 100 INJECTION, SOLUTION INTRAVENOUS; SUBCUTANEOUS at 06:46

## 2020-07-11 RX ADMIN — ATORVASTATIN CALCIUM SCH MG: 40 TABLET, FILM COATED ORAL at 22:40

## 2020-07-11 RX ADMIN — LISINOPRIL SCH MG: 20 TABLET ORAL at 09:54

## 2020-07-11 RX ADMIN — ATENOLOL SCH MG: 25 TABLET ORAL at 09:54

## 2020-07-11 RX ADMIN — INSULIN ASPART SCH: 100 INJECTION, SOLUTION INTRAVENOUS; SUBCUTANEOUS at 22:40

## 2020-07-11 RX ADMIN — VITAM B12 SCH MCG: 100 TAB at 10:09

## 2020-07-11 RX ADMIN — ASPIRIN SCH MG: 81 TABLET, COATED ORAL at 09:54

## 2020-07-11 RX ADMIN — SODIUM CHLORIDE SCH MLS/HR: 9 INJECTION, SOLUTION INTRAVENOUS at 06:11

## 2020-07-11 RX ADMIN — ACETAMINOPHEN PRN MG: 325 TABLET ORAL at 20:19

## 2020-07-11 RX ADMIN — ATENOLOL SCH MG: 25 TABLET ORAL at 22:40

## 2020-07-11 RX ADMIN — SODIUM CHLORIDE SCH MLS/HR: 9 INJECTION, SOLUTION INTRAVENOUS at 16:33

## 2020-07-11 RX ADMIN — VITAMIN D, TAB 1000IU (100/BT) SCH UNIT: 25 TAB at 09:54

## 2020-07-11 RX ADMIN — AMLODIPINE BESYLATE SCH MG: 10 TABLET ORAL at 09:54

## 2020-07-11 RX ADMIN — INSULIN ASPART SCH: 100 INJECTION, SOLUTION INTRAVENOUS; SUBCUTANEOUS at 12:01

## 2020-07-11 RX ADMIN — INSULIN ASPART SCH: 100 INJECTION, SOLUTION INTRAVENOUS; SUBCUTANEOUS at 16:49

## 2020-07-11 NOTE — PDOC
*Physical Exam





- Vital Signs


                                Last Vital Signs











Temp Pulse Resp BP Pulse Ox


 


 99.9 F H  60   20   175/79 H  99 


 


 07/10/20 20:55  07/11/20 00:15  07/11/20 00:15  07/11/20 00:15  07/11/20 00:15














ED Treatment Course





- LABORATORY


CBC & Chemistry Diagram: 


                                 07/10/20 22:23





                                 07/10/20 22:23





- ADDITIONAL ORDERS


Additional order review: 


                               Laboratory  Results











  07/11/20 07/10/20 07/10/20





  00:16 22:23 22:23


 


PT with INR   


 


INR   


 


PTT (Actin FS)   


 


Sodium   


 


Potassium   


 


Chloride   


 


Carbon Dioxide   


 


Anion Gap   


 


BUN   


 


Creatinine   


 


Est GFR (CKD-EPI)AfAm   


 


Est GFR (CKD-EPI)NonAf   


 


Random Glucose   


 


Lactic Acid    2.5 H*


 


Calcium   


 


Magnesium   


 


Total Bilirubin   


 


AST   


 


ALT   


 


Alkaline Phosphatase   


 


Creatine Kinase   


 


Creatine Kinase Index   


 


CK-MB (CK-2)   


 


Troponin I   


 


Total Protein   


 


Albumin   


 


Lipase   


 


Urine Color  Yellow  


 


Urine Appearance  Clear  


 


Urine pH  6.5  


 


Ur Specific Gravity  1.019  


 


Urine Protein  3+ H  


 


Urine Glucose (UA)  Trace  


 


Urine Ketones  Negative  


 


Urine Blood  1+ H  


 


Urine Nitrite  Negative  


 


Urine Bilirubin  Negative  


 


Urine Urobilinogen  0.2  


 


Ur Leukocyte Esterase  Negative  


 


Urine WBC (Auto)  4  


 


Urine RBC (Auto)  22  


 


Urine Casts (Auto)  1  


 


U Epithel Cells (Auto)  7  


 


Urine Bacteria (Auto)  23  


 


Blood Type   O POSITIVE 


 


Antibody Screen   Negative 














  07/10/20 07/10/20





  22:23 22:23


 


PT with INR   13.10 H


 


INR   1.11 H


 


PTT (Actin FS)   29.7


 


Sodium  130 L 


 


Potassium  3.9 


 


Chloride  89 L 


 


Carbon Dioxide  32 


 


Anion Gap  10 


 


BUN  16.9 


 


Creatinine  1.0 


 


Est GFR (CKD-EPI)AfAm  67.51 


 


Est GFR (CKD-EPI)NonAf  58.25 


 


Random Glucose  208 H 


 


Lactic Acid  


 


Calcium  10.6 H 


 


Magnesium  1.5 L 


 


Total Bilirubin  0.6 


 


AST  20 


 


ALT  19 


 


Alkaline Phosphatase  48 


 


Creatine Kinase  159 


 


Creatine Kinase Index  3.2 


 


CK-MB (CK-2)  5.1 H 


 


Troponin I  1.12 H* 


 


Total Protein  8.3 H 


 


Albumin  4.7 


 


Lipase  90 


 


Urine Color  


 


Urine Appearance  


 


Urine pH  


 


Ur Specific Gravity  


 


Urine Protein  


 


Urine Glucose (UA)  


 


Urine Ketones  


 


Urine Blood  


 


Urine Nitrite  


 


Urine Bilirubin  


 


Urine Urobilinogen  


 


Ur Leukocyte Esterase  


 


Urine WBC (Auto)  


 


Urine RBC (Auto)  


 


Urine Casts (Auto)  


 


U Epithel Cells (Auto)  


 


Urine Bacteria (Auto)  


 


Blood Type  


 


Antibody Screen  








                                        











  07/10/20





  22:23


 


RBC  5.11


 


MCV  81.5


 


MCHC  32.6


 


RDW  14.1


 


MPV  8.6


 


Neutrophils %  79.0  D


 


Lymphocytes %  16.2  D


 


Monocytes %  4.3


 


Eosinophils %  0.0  D


 


Basophils %  0.5














- Medications


Given in the ED: 


ED Medications














Discontinued Medications














Generic Name Dose Route Start Last Admin





  Trade Name Harjinder  PRN Reason Stop Dose Admin


 


Acetaminophen  1,000 mg  07/10/20 21:25  07/10/20 22:43





  Ofirmev Injection -  IVPB  07/10/20 21:26  1,000 mg





  ONCE ONE   Administration


 


Al Hydroxide/Mg Hydroxide  30 ml  07/10/20 21:25  07/10/20 22:22





  Mylanta Oral Suspension -  PO  07/10/20 21:26  Not Given





  ONCE ONE  


 


Famotidine/Sodium Chloride  20 mg in 50 mls @ 100 mls/hr  07/10/20 21:25  

07/10/20 22:47





  Pepcid 20 Mg Premixed Ivpb -  IVPB  07/10/20 21:54  100 mls/hr





  ONCE ONE   Administration


 


Sodium Chloride  1,000 mls @ 1,000 mls/hr  07/10/20 21:25  07/10/20 22:43





  Normal Saline -  IV  07/10/20 22:24  1,000 mls/hr





  ASDIR STA   Administration


 


Lactated Ringer's  1,000 ml  07/11/20 00:44  07/11/20 01:03





  Lactated Ringers Solution  IV  07/11/20 00:45  1,000 ml





  ONCE ONE   Administration


 


Magnesium Sulfate  2 gm  07/10/20 23:33  07/11/20 00:14





  Magnesium Sulfate  IVPB  07/10/20 23:34  2 gm





  ONCE ONE   Administration


 


Ondansetron HCl  4 mg  07/10/20 21:37  07/10/20 22:44





  Zofran Injection  IVPUSH  07/10/20 21:38  4 mg





  ONCE ONE   Administration














Medical Decision Making





- Medical Decision Making





07/11/20 01:24


Patient Name: SANG REN


THIS IS A PRELIMINARY REPORT FROM IMAGING ON CALL


DATE OF SERVICE: 2020-07-10 23:40:58


IMAGES: [id number]


EXAM: CT ABDOMEN AND PELVIS WITH CONTRAST


Minimally to moderately dilated small bowel all four quadrants, consistent with 

SBO. Transition


point not identified with certainty, but possibly in RLQ, where there is a small

area of apparent


mesenteric swirling, cannot exclude closed loop small bowel obstruction. No free

air. Appendix not


seen.


Small ascites.


Unremarkable liver, spleen, stomach, pancreas and gallbladder.


Cortical scarring left greater than right kidneys.


5.4 cm RUQ ventral hernia containing fat.


Enlarged mediastinal and left hilar lymph nodes, indeterminate.


07/11/20 06:04


NGT placed and hooked to suction.  She will be admitted to med surg under 

hospitalists, and she will beevaluated by general surgery in the AM.





Discharge





- Discharge Information


Problems reviewed: Yes


Clinical Impression/Diagnosis: 


 Small bowel obstruction, NSTEMI (non-ST elevated myocardial infarction), Lactic

acidosis





Condition: Fair





- Follow up/Referral





- Patient Discharge Instructions





- Post Discharge Activity

## 2020-07-11 NOTE — EKG
Test Reason : 

Blood Pressure : ***/*** mmHG

Vent. Rate : 066 BPM     Atrial Rate : 066 BPM

   P-R Int : 168 ms          QRS Dur : 086 ms

    QT Int : 438 ms       P-R-T Axes : 004 010 042 degrees

   QTc Int : 459 ms

 

NORMAL SINUS RHYTHM

NORMAL ECG

WHEN COMPARED WITH ECG OF 11-JUL-2020 02:19,

NO SIGNIFICANT CHANGE WAS FOUND

Confirmed by RACHAEL TEJEDA MD ([id number]) on 7/11/2020 11:11:02 AM

 

Referred By: MARCIO RITCHIE           Confirmed By:RACHAEL TEJEDA MD

## 2020-07-11 NOTE — EKG
Test Reason : 

Blood Pressure : ***/*** mmHG

Vent. Rate : 063 BPM     Atrial Rate : 063 BPM

   P-R Int : 166 ms          QRS Dur : 076 ms

    QT Int : 456 ms       P-R-T Axes : 023 051 068 degrees

   QTc Int : 466 ms

 

NORMAL SINUS RHYTHM

NORMAL ECG

WHEN COMPARED WITH ECG OF 09-OCT-2018 19:00,

QT HAS LENGTHENED

Confirmed by RACHAEL TEJEDA MD ([id number]) on 7/11/2020 11:11:11 AM

 

Referred By:             Confirmed By:RACHAEL TEJEDA MD

## 2020-07-11 NOTE — CONSULT
- Consultation


REQUESTING PROVIDER: ED MD





CONSULT REQUEST: We have been asked to surgically evaluate this patient for a sm

all bowel obstruction.





PCP:Antonino Marquez MD





HISTORY OF PRESENT ILLNESS:66 y/o A/A female presented w/ nausea/vomiting and 

abdominal pain; she also stated she was not passing flatus. Pain was crampy in 

nature and generalized. She came to the Ed for evaluation. She has had 2 sbo's 

in the past; one managed operatively and one conservatively.





PMHx: NIDDM/HTN/CAD/HLD         





PSHx: JOLLY/ex-lap for SBO.       


                                Home Medications











 Medication  Instructions  Recorded


 


Aspirin [Aspirin EC] 325 mg PO DAILY 10/22/15


 


Atenolol [Tenormin] 25 mg PO BID 10/22/15


 


Lisinopril [Prinivil -] 20 mg PO DAILY 10/22/15


 


Metformin HCl [Glucophage] 500 mg PO BID 10/22/15


 


Amlodipine Besylate [Norvasc -] 10 mg PO DAILY 01/30/20


 


Atorvastatin Calcium [Lipitor] 40 mg PO HS 01/30/20


 


Ergocalciferol (Vitamin D2) 1,000 unit PO DAILY 01/30/20





[Vitamin D2]  


 


Vitamin B12 100 mg PO DAILY 01/30/20


 


Glimepiride 1 mg PO DAILY 07/11/20


 


Naproxen 500 mg PO Q12H PRN 07/11/20








                                    Allergies











Allergy/AdvReac Type Severity Reaction Status Date / Time


 


No Known Drug Allergies Allergy   Verified 07/10/20 20:58








REVIEW OF SYSTEMS:


CONSTITUTIONAL: 


Absent:  fever, chills, diaphoresis, generalized weakness, malaise, loss of 

appetite, weight change


CARDIOVASCULAR: 


Present: chest pain, syncope, palpitations, irregular heart rate, 

lightheadedness, Absent: peripheral edema


RESPIRATORY: 


Absent: cough, shortness of breath, dyspnea with exertion, wheezing, stridor, 

hemoptysis


GASTROINTESTINAL:


Present: abdominal pain, abdominal distension, nausea, vomiting, Absent: 

diarrhea, constipation, melena, hematochezia


GENITOURINARY: 


Absent: dysuria, frequency, urgency, hesitancy, hematuria, flank pain, genital 

pain


MUSCULOSKELETAL: 


Absent: myalgia, arthralgia, joint swelling, back pain, neck pain


SKIN: 


Absent: rash, itching, pallor


HEMATOLOGIC/IMMUNOLOGIC: 


Absent: easy bleeding, easy bruising, lymphadenopathy


NEUROLOGIC: 


Absent: headache, focal weakness, paresthesias, dizziness, unsteady gait, 

seizure, mental status changes, 


bladder or bowel incontinence


PSYCHIATRIC: 


Absent: anxiety, depression, suicidal or homicidal ideation, hallucinations.





PHYSICAL EXAM:


GENERAL: Awake, alert, and fully oriented, in no acute distress.


HEAD: Normal with no signs of trauma.


EYES: PERRL, sclera anicteric, conjunctiva clear.


NECK: Normal ROM, supple without lymphadenopathy, JVD, or masses.


ABDOMEN: Soft, nontender, minimally distended and minimally tympanitic, no 

guarding, no rebound, no masses.  No organomegaly. Incisional hernialikely 

chronicall incarcerated.


MUSCULOSKELETAL: Normal ROM at all joints. No bony deformities or tenderness. No

CVA tenderness.


UPPER EXTREMITIES: 2+ pulses, warm, well-perfused. No cyanosis. Cap refill <2 

seconds. No peripheral edema.


LOWER EXTREMITIES: 2+ pulses, warm, well-perfused. No calf tenderness. No 

peripheral edema. 


NEUROLOGICAL: Normal speech, gait not observed.


PSYCH: Cooperative. Good eye contact. Appropriate mood and affect.


SKIN: Warm, dry, normal turgor, no rashes or lesions noted.


                                   Vital Signs











Temperature  98 F   07/11/20 08:45


 


Pulse Rate  64   07/11/20 08:45


 


Respiratory Rate  18   07/11/20 08:45


 


Blood Pressure  149/71   07/11/20 08:45


 


O2 Sat by Pulse Oximetry (%)  95   07/11/20 05:45








                                   Lab Results











WBC  7.7 K/mm3 (4.0-10.0)   07/11/20  05:35    


 


RBC  4.68 M/mm3 (3.60-5.2)   07/11/20  05:35    


 


Hgb  12.3 GM/dL (10.7-15.3)   07/11/20  05:35    


 


Hct  38.3 % (32.4-45.2)   07/11/20  05:35    


 


MCV  81.8 fl (80-96)   07/11/20  05:35    


 


MCHC  32.2 g/dl (32.0-36.0)   07/11/20  05:35    


 


RDW  13.9 % (11.6-15.6)   07/11/20  05:35    


 


Plt Count  255 K/MM3 (134-434)   07/11/20  05:35    


 


INR  1.11  (0.83-1.09)  H  07/10/20  22:23    


 


Sodium  132 mmol/L (136-145)  L  07/11/20  05:35    


 


Potassium  3.9 mmol/L (3.5-5.1)   07/11/20  05:35    


 


Chloride  94 mmol/L ()  L  07/11/20  05:35    


 


Carbon Dioxide  30 mmol/L (21-32)   07/11/20  05:35    


 


Anion Gap  8 MMOL/L (8-16)   07/11/20  05:35    


 


BUN  15.5 mg/dL (7-18)   07/11/20  05:35    


 


Creatinine  1.0 mg/dL (0.55-1.3)   07/11/20  05:35    


 


Random Glucose  165 mg/dL ()  H  07/11/20  05:35    


 


Calcium  9.5 mg/dL (8.5-10.1)   07/11/20  05:35    


 


Blood Type  O POSITIVE   07/10/20  22:23    


 


Antibody Screen  Negative   07/10/20  22:23    








CT scan a/p reviewed





IMP: recurrent SBO





PLAN: NPO/IVF/NGT/serial exams and abdominal xrays; possible laparotomy if no 

response to conservative tx.





Pedro Luis Avalos MD FACS

## 2020-07-11 NOTE — PN
Progress Note (short form)





- Note


Progress Note: 





65 year old female patient with past medical history that includes HTN, DM, 

herniated disc, sciatica, rerstless leg syndrome, herniated disk, exploratory 

laparatomy, Hx of SBO, who prersented to the ED with nausea, vomiting, and 

abdominal pain.  The patient's symptoms began 3 days ago on Thursday and have 

been getting gradually worse.  She had her last bowel movement yesterday, but 

has not been eating due to her symptoms.  The patient has had a previous SBO 

that resolved with use of a NG tube.  Another SBO in the 1980's was resolved 

using exploratory laparatomy.  The patient also has an extensive cardiac history

that began when she once was running for the bus and felt a large pressure on 

her chest as if someone was choking her.  She went to the doctor and had a 

nuclear stress test done with subsequent insertion of 2 stents in her heart. The

patient denies fever/chills, night sweats, headache, vision changes, chest pain,

shortness of breath, pallpitations, cough, leg swelling, abdominal pain, nausea,

vomiting, diarrhea, constipation, dysuria.


ECG:NSR, q in lat leads





Recent Travel:





PAST MEDICAL HISTORY:


HTN, DM, herniated disc, sciatica, rerstless leg syndrome, herniated disk, Hx of

SBO





PAST SURGICAL HISTORY:


exploratory laparatomy


cardiac cath with 2 stents placed





Social History:


Smoking: denies


Alcohol:denies


Drugs: denies





Allergies





No Known Drug Allergies Allergy (Verified 07/10/20 20:58)


   








HOME MEDICATIONS:


                                Home Medications











 Medication  Instructions  Recorded


 


Aspirin [Aspirin EC] 325 mg PO DAILY 10/22/15


 


Atenolol [Tenormin] 25 mg PO BID 10/22/15


 


Lisinopril [Prinivil -] 20 mg PO DAILY 10/22/15


 


Metformin HCl [Glucophage] 500 mg PO BID 10/22/15


 


Amlodipine Besylate [Norvasc -] 10 mg PO DAILY 01/30/20


 


Atorvastatin Calcium [Lipitor] 40 mg PO HS 01/30/20


 


Ergocalciferol (Vitamin D2) 1,000 unit PO DAILY 01/30/20





[Vitamin D2]  


 


Vitamin B12 100 mg PO DAILY 01/30/20


 


Glimepiride 1 mg PO DAILY 07/11/20


 


Naproxen 500 mg PO Q12H PRN 07/11/20








REVIEW OF SYSTEMS


CONSTITUTIONAL: 


Absent:  fever, chills, diaphoresis


HEENT: 


Absent:  visual changes


CARDIOVASCULAR: 


Absent: chest pain, palpitations, lightheadedness


RESPIRATORY: 


Absent: cough, shortness of breath


GASTROINTESTINAL:


Absent: abdominal pain, nausea, vomiting, diarrhea, constipation


GENITOURINARY: 


Absent: dysuria


NEUROLOGIC: 


Absent: headache, dizziness








PHYSICAL EXAMINATION


                               Vital Signs - 24 hr











  07/10/20 07/11/20 07/11/20





  20:55 00:15 02:33


 


Temperature 99.9 F H  


 


Pulse Rate 63  


 


Pulse Rate [  60 59 L





Apical]   


 


Respiratory 18 20 18





Rate   


 


Blood Pressure 184/80 H  


 


Blood Pressure  175/79 H 136/75





[Left Arm]   


 


O2 Sat by Pulse 97 99 96





Oximetry (%)   














  07/11/20 07/11/20





  03:20 05:45


 


Temperature 98.3 F 


 


Pulse Rate 52 L 


 


Pulse Rate [  





Apical]  


 


Respiratory 16 





Rate  


 


Blood Pressure 138/67 


 


Blood Pressure  





[Left Arm]  


 


O2 Sat by Pulse  95





Oximetry (%)  











GENERAL: Awake, alert, and fully oriented, in no acute distress.


HEAD: Normal with no signs of trauma.


EYES: Extraocular movements intact


EARS, NOSE, THROAT: Ears normal, nares patent, oropharynx clear without e

xudates. Moist mucous membranes.


NECK: Normal range of motion, supple without lymphadenopathy, JVD, or masses.


LUNGS: Breath sounds equal, clear to auscultation bilaterally. No wheezes, and 

no crackles. No accessory muscle use.


HEART: grade 2 systolic murmur at right sternal border.  Regular rate and 

rhythm, normal S1 and S2 without murmur, rub or gallop.


ABDOMEN: Soft, nontender, not distended, hyperactive bowel sounds, no guarding, 

no rebound, no masses. 


MUSCULOSKELETAL: Normal range of motion at all joints. No bony deformities or 

tenderness.


UPPER EXTREMITIES: 2+ pulses, warm, well-perfused. No cyanosis. No clubbing. No 

peripheral edema.


LOWER EXTREMITIES: 2+ pulses, warm, well-perfused. No calf tenderness. No 

peripheral edema. 


NEUROLOGICAL:  Normal speech. Normal gait.


PSYCHIATRIC: Cooperative. Good eye contact. Appropriate mood and affect.


SKIN: Warm, dry, normal turgor, no rashes or lesions noted, normal capillary 

refill. 


                                    CBC, BMP





                                 07/11/20 05:35 





                                 07/11/20 05:35 





                                  Troponin, BNP











  07/10/20 07/11/20 07/11/20





  22:23 02:23 05:35


 


Troponin I  1.12 H*  1.84 H*  1.65 H*








Active Medications





Acetaminophen (Tylenol -)  650 mg PO Q4H PRN


   PRN Reason: PAIN LEVEL 4 - 6


Amlodipine Besylate (Norvasc -)  10 mg PO DAILY [facility]


   Last Admin: 07/11/20 09:54 Dose:  10 mg


   Documented by: 


Aspirin (Ecotrin -)  81 mg PO DAILY [facility]


   Last Admin: 07/11/20 09:54 Dose:  81 mg


   Documented by: 


Atenolol (Tenormin -)  25 mg PO BID [facility]


   Last Admin: 07/11/20 09:54 Dose:  25 mg


   Documented by: 


Atorvastatin Calcium (Lipitor -)  40 mg PO [facility]


Cholecalciferol (Vitamin D3 -)  1,000 unit PO DAILY [facility]


   Last Admin: 07/11/20 09:54 Dose:  1,000 unit


   Documented by: 


Cyanocobalamin (Vitamin B12 -)  100 mcg PO DAILY [facility]


   Last Admin: 07/11/20 10:09 Dose:  100 mcg


   Documented by: 


Heparin Sodium (Porcine) (Heparin -)  1,000 unit IVPUSH PRN PRN


   PRN Reason: Heparin


Heparin Sodium (Porcine) (Heparin -)  5,000 unit IVPUSH PRN PRN


   PRN Reason: Heparin


Sodium Chloride (Normal Saline -)  1,000 mls @ 125 mls/hr IV ASDIR [facility]


   Last Admin: 07/11/20 06:11 Dose:  125 mls/hr


   Documented by: 


HEPARIN SOD,PORK IN 0.45% NACL (Heparin-1/2ns 25,000 Units/500)  25,000 units in

500 mls @ 20 mls/hr IVPB TITR [facility]; Protocol


Insulin Aspart (Novolog Vial Sliding Scale -)  1 vial SQ ACHS [facility]; Protocol


   Last Admin: 07/11/20 06:46 Dose:  Not Given


   Documented by: 


Lisinopril (Prinivil)  20 mg PO DAILY [facility]


   Last Admin: 07/11/20 09:54 Dose:  20 mg


   Documented by: 








ASSESSMENT/PLAN:


65 year old female patient with past medical history that includes HTN, DM, 

herniated disc, sciatica, rerstless leg syndrome, herniated disk, exploratory 

laparatomy, Hx of SBO, who prersented to the ED with nausea, vomiting, and 

abdominal pain.





1. SBO


- SBO found on CT A/P


- NG tube


- IV fluids


- Tylenol prn


- NPO





2. Tropenemia 2/2 demand cardiac ischemia r/o acs


- Troponins positive, but patient reports no chest pain and ecg negative


- Patient may have cardiac ischemia 2/2 fluid loss from vomiting and not staying

well hydrated enough due to the SBO


- f/u ecg


- consult cardio





3. HTN


- Amlodipine, Atenolol





4. DM


- ISS





5. FEN


- IV fluids, NPO, monitor lytes

## 2020-07-11 NOTE — HP
CHIEF COMPLAINT: nausea, vomiting, abdominal pain





PCP:





HISTORY OF PRESENT ILLNESS:


65 year old female patient with past medical history that includes HTN, DM, 

herniated disc, sciatica, rerstless leg syndrome, herniated disk, exploratory 

laparatomy, Hx of SBO, who prersented to the ED with nausea, vomiting, and 

abdominal pain.  The patient's symptoms began 3 days ago on Thursday and have 

been getting gradually worse.  She had her last bowel movement yesterday, but 

has not been eating due to her symptoms.  The patient has had a previous SBO 

that resolved with use of a NG tube.  Another SBO in the 1980's was resolved 

using exploratory laparatomy.  The patient also has an extensive cardiac history

that began when she once was running for the bus and felt a large pressure on 

her chest as if someone was choking her.  She went to the doctor and had a 

nuclear stress test done with subsequent insertion of 2 stents in her heart. The

patient denies fever/chills, night sweats, headache, vision changes, chest pain,

shortness of breath, pallpitations, cough, leg swelling, abdominal pain, nausea,

vomiting, diarrhea, constipation, dysuria.





Recent Travel:





PAST MEDICAL HISTORY:


HTN, DM, herniated disc, sciatica, rerstless leg syndrome, herniated disk, Hx of

SBO





PAST SURGICAL HISTORY:


exploratory laparatomy


cardiac cath with 2 stents placed





Social History:


Smoking: denies


Alcohol:denies


Drugs: denies





Allergies





No Known Drug Allergies Allergy (Verified 07/10/20 20:58)


   








HOME MEDICATIONS:


                                Home Medications











 Medication  Instructions  Recorded


 


Aspirin [Aspirin EC] 325 mg PO DAILY 10/22/15


 


Atenolol [Tenormin] 25 mg PO BID 10/22/15


 


Lisinopril [Prinivil -] 20 mg PO DAILY 10/22/15


 


Metformin HCl [Glucophage] 500 mg PO BID 10/22/15


 


Amlodipine Besylate [Norvasc -] 10 mg PO DAILY 01/30/20


 


Atorvastatin Calcium [Lipitor] 40 mg PO HS 01/30/20


 


Ergocalciferol (Vitamin D2) 1,000 unit PO DAILY 01/30/20





[Vitamin D2]  


 


Vitamin B12 100 mg PO DAILY 01/30/20


 


Glimepiride 1 mg PO DAILY 07/11/20


 


Naproxen 500 mg PO Q12H PRN 07/11/20








REVIEW OF SYSTEMS


CONSTITUTIONAL: 


Absent:  fever, chills, diaphoresis


HEENT: 


Absent:  visual changes


CARDIOVASCULAR: 


Absent: chest pain, palpitations, lightheadedness


RESPIRATORY: 


Absent: cough, shortness of breath


GASTROINTESTINAL:


Absent: abdominal pain, nausea, vomiting, diarrhea, constipation


GENITOURINARY: 


Absent: dysuria


NEUROLOGIC: 


Absent: headache, dizziness








PHYSICAL EXAMINATION


                               Vital Signs - 24 hr











  07/10/20 07/11/20 07/11/20





  20:55 00:15 02:33


 


Temperature 99.9 F H  


 


Pulse Rate 63  


 


Pulse Rate [  60 59 L





Apical]   


 


Respiratory 18 20 18





Rate   


 


Blood Pressure 184/80 H  


 


Blood Pressure  175/79 H 136/75





[Left Arm]   


 


O2 Sat by Pulse 97 99 96





Oximetry (%)   














  07/11/20 07/11/20





  03:20 05:45


 


Temperature 98.3 F 


 


Pulse Rate 52 L 


 


Pulse Rate [  





Apical]  


 


Respiratory 16 





Rate  


 


Blood Pressure 138/67 


 


Blood Pressure  





[Left Arm]  


 


O2 Sat by Pulse  95





Oximetry (%)  











GENERAL: Awake, alert, and fully oriented, in no acute distress.


HEAD: Normal with no signs of trauma.


EYES: Extraocular movements intact


EARS, NOSE, THROAT: Ears normal, nares patent, oropharynx clear without 

exudates. Moist mucous membranes.


NECK: Normal range of motion, supple without lymphadenopathy, JVD, or masses.


LUNGS: Breath sounds equal, clear to auscultation bilaterally. No wheezes, and 

no crackles. No accessory muscle use.


HEART: grade 2 systolic murmur at right sternal border.  Regular rate and 

rhythm, normal S1 and S2 without murmur, rub or gallop.


ABDOMEN: Soft, nontender, not distended, hyperactive bowel sounds, no guarding, 

no rebound, no masses. 


MUSCULOSKELETAL: Normal range of motion at all joints. No bony deformities or 

tenderness.


UPPER EXTREMITIES: 2+ pulses, warm, well-perfused. No cyanosis. No clubbing. No 

peripheral edema.


LOWER EXTREMITIES: 2+ pulses, warm, well-perfused. No calf tenderness. No 

peripheral edema. 


NEUROLOGICAL:  Normal speech. Normal gait.


PSYCHIATRIC: Cooperative. Good eye contact. Appropriate mood and affect.


SKIN: Warm, dry, normal turgor, no rashes or lesions noted, normal capillary 

refill. 


                         Laboratory Results - last 24 hr











  07/10/20 07/10/20 07/10/20





  22:23 22:23 22:23


 


WBC  9.6  


 


RBC  5.11  


 


Hgb  13.6  


 


Hct  41.7  


 


MCV  81.5  


 


MCH  26.6  


 


MCHC  32.6  


 


RDW  14.1  


 


Plt Count  278  D  


 


MPV  8.6  


 


Absolute Neuts (auto)  7.6  


 


Neutrophils %  79.0  D  


 


Lymphocytes %  16.2  D  


 


Monocytes %  4.3  


 


Eosinophils %  0.0  D  


 


Basophils %  0.5  


 


Nucleated RBC %  0  


 


PT with INR   13.10 H 


 


INR   1.11 H 


 


PTT (Actin FS)   29.7 


 


Sodium    130 L


 


Potassium    3.9


 


Chloride    89 L


 


Carbon Dioxide    32


 


Anion Gap    10


 


BUN    16.9


 


Creatinine    1.0


 


Est GFR (CKD-EPI)AfAm    67.51


 


Est GFR (CKD-EPI)NonAf    58.25


 


Random Glucose    208 H


 


Lactic Acid   


 


Calcium    10.6 H


 


Magnesium    1.5 L


 


Total Bilirubin    0.6


 


AST    20


 


ALT    19


 


Alkaline Phosphatase    48


 


Creatine Kinase    159


 


Creatine Kinase Index    3.2


 


CK-MB (CK-2)    5.1 H


 


Troponin I    1.12 H*


 


Total Protein    8.3 H


 


Albumin    4.7


 


Lipase    90


 


Urine Color   


 


Urine Appearance   


 


Urine pH   


 


Ur Specific Gravity   


 


Urine Protein   


 


Urine Glucose (UA)   


 


Urine Ketones   


 


Urine Blood   


 


Urine Nitrite   


 


Urine Bilirubin   


 


Urine Urobilinogen   


 


Ur Leukocyte Esterase   


 


Urine WBC (Auto)   


 


Urine RBC (Auto)   


 


Urine Casts (Auto)   


 


U Epithel Cells (Auto)   


 


Urine Bacteria (Auto)   


 


Blood Type   


 


Antibody Screen   














  07/10/20 07/10/20 07/11/20





  22:23 22:23 00:16


 


WBC   


 


RBC   


 


Hgb   


 


Hct   


 


MCV   


 


MCH   


 


MCHC   


 


RDW   


 


Plt Count   


 


MPV   


 


Absolute Neuts (auto)   


 


Neutrophils %   


 


Lymphocytes %   


 


Monocytes %   


 


Eosinophils %   


 


Basophils %   


 


Nucleated RBC %   


 


PT with INR   


 


INR   


 


PTT (Actin FS)   


 


Sodium   


 


Potassium   


 


Chloride   


 


Carbon Dioxide   


 


Anion Gap   


 


BUN   


 


Creatinine   


 


Est GFR (CKD-EPI)AfAm   


 


Est GFR (CKD-EPI)NonAf   


 


Random Glucose   


 


Lactic Acid  2.5 H*  


 


Calcium   


 


Magnesium   


 


Total Bilirubin   


 


AST   


 


ALT   


 


Alkaline Phosphatase   


 


Creatine Kinase   


 


Creatine Kinase Index   


 


CK-MB (CK-2)   


 


Troponin I   


 


Total Protein   


 


Albumin   


 


Lipase   


 


Urine Color    Yellow


 


Urine Appearance    Clear


 


Urine pH    6.5


 


Ur Specific Gravity    1.019


 


Urine Protein    3+ H


 


Urine Glucose (UA)    Trace


 


Urine Ketones    Negative


 


Urine Blood    1+ H


 


Urine Nitrite    Negative


 


Urine Bilirubin    Negative


 


Urine Urobilinogen    0.2


 


Ur Leukocyte Esterase    Negative


 


Urine WBC (Auto)    4


 


Urine RBC (Auto)    22


 


Urine Casts (Auto)    1


 


U Epithel Cells (Auto)    7


 


Urine Bacteria (Auto)    23


 


Blood Type   O POSITIVE 


 


Antibody Screen   Negative 














  07/11/20 07/11/20





  02:23 02:23


 


WBC  


 


RBC  


 


Hgb  


 


Hct  


 


MCV  


 


MCH  


 


MCHC  


 


RDW  


 


Plt Count  


 


MPV  


 


Absolute Neuts (auto)  


 


Neutrophils %  


 


Lymphocytes %  


 


Monocytes %  


 


Eosinophils %  


 


Basophils %  


 


Nucleated RBC %  


 


PT with INR  


 


INR  


 


PTT (Actin FS)  


 


Sodium  


 


Potassium  


 


Chloride  


 


Carbon Dioxide  


 


Anion Gap  


 


BUN  


 


Creatinine  


 


Est GFR (CKD-EPI)AfAm  


 


Est GFR (CKD-EPI)NonAf  


 


Random Glucose  


 


Lactic Acid  2.7 H* 


 


Calcium  


 


Magnesium  


 


Total Bilirubin  


 


AST  


 


ALT  


 


Alkaline Phosphatase  


 


Creatine Kinase  


 


Creatine Kinase Index  


 


CK-MB (CK-2)  


 


Troponin I   1.84 H*


 


Total Protein  


 


Albumin  


 


Lipase  


 


Urine Color  


 


Urine Appearance  


 


Urine pH  


 


Ur Specific Gravity  


 


Urine Protein  


 


Urine Glucose (UA)  


 


Urine Ketones  


 


Urine Blood  


 


Urine Nitrite  


 


Urine Bilirubin  


 


Urine Urobilinogen  


 


Ur Leukocyte Esterase  


 


Urine WBC (Auto)  


 


Urine RBC (Auto)  


 


Urine Casts (Auto)  


 


U Epithel Cells (Auto)  


 


Urine Bacteria (Auto)  


 


Blood Type  


 


Antibody Screen  











ASSESSMENT/PLAN:


65 year old female patient with past medical history that includes HTN, DM, 

herniated disc, sciatica, rerstless leg syndrome, herniated disk, exploratory 

laparatomy, Hx of SBO, who prersented to the ED with nausea, vomiting, and 

abdominal pain.





1. SBO


- SBO found on CT A/P


- NG tube


- IV fluids


- Tylenol prn


- NPO





2. Tropenemia 2/2 demand cardiac ischemia r/o acs


- Troponins positive, but patient reports no chest pain and ecg negative


- Patient may have cardiac ischemia 2/2 fluid loss from vomiting and not staying

well hydrated enough due to the SBO


- f/u ecg


- consult cardio





3. HTN


- Amlodipine, Atenolol





4. DM


- ISS





5. FEN


- IV fluids, NPO, monitor lytes





DVT Px-SCDs





Dispo - tele, f/u ecg, f/u cardio








Visit type





- Emergency Visit


Emergency Visit: Yes


ED Registration Date: 07/11/20


Care time: The patient presented to the Emergency Department on the above date 

and was hospitalized for further evaluation of their emergent condition.





- New Patient


This patient is new to me today: Yes


Date on this admission: 07/11/20





- Critical Care


Critical Care patient: No





ATTENDING PHYSICIAN STATEMENT





I saw and evaluated the patient.


I reviewed the resident's note and discussed the case with the resident.


I agree with the resident's findings and plan as documented.








SUBJECTIVE:








OBJECTIVE:








ASSESSMENT AND PLAN:

## 2020-07-11 NOTE — PN
Teaching Attending Note


Name of Resident: Pranav Jones





ATTENDING PHYSICIAN STATEMENT





I saw and evaluated the patient.


I reviewed the resident's note and discussed the case with the resident.


I agree with the resident's findings and plan as documented.








SUBJECTIVE: This is a 65 year old woman with a history of HTN, type 2 DM, RLS, 

sciatica, exploratory laparotomy for SBO who comes to the ED complaining of 

abdominal pain, nausea and vomiting. Pain started suddenly 2 days ago. She 

describes it as intermittent and diffuse. Yesterday, she developed vomiting. She

had a small bowel movement yesterday morning. 








OBJECTIVE:


                                   Vital Signs











 Period  Temp  Pulse  Resp  BP Sys/Vasquez  Pulse Ox


 


 Last 24 Hr  99.9 F  59-63  18-20  136-184/75-80  96-99








HEART: S1S2, RRR


LUNGS: Clear


ABDOMEN: Distended, soft, non-tender, hyperactive BS


EXTREMITIES: No edema





                                Laboratory Tests











  07/10/20 07/10/20 07/10/20





  22:23 22:23 22:23


 


WBC  9.6  


 


RBC  5.11  


 


Hgb  13.6  


 


Hct  41.7  


 


MCV  81.5  


 


MCH  26.6  


 


MCHC  32.6  


 


RDW  14.1  


 


Plt Count  278  D  


 


MPV  8.6  


 


Absolute Neuts (auto)  7.6  


 


Neutrophils %  79.0  D  


 


Lymphocytes %  16.2  D  


 


Monocytes %  4.3  


 


Eosinophils %  0.0  D  


 


Basophils %  0.5  


 


Nucleated RBC %  0  


 


PT with INR   13.10 H 


 


INR   1.11 H 


 


PTT (Actin FS)   29.7 


 


Sodium    130 L


 


Potassium    3.9


 


Chloride    89 L


 


Carbon Dioxide    32


 


Anion Gap    10


 


BUN    16.9


 


Creatinine    1.0


 


Est GFR (CKD-EPI)AfAm    67.51


 


Est GFR (CKD-EPI)NonAf    58.25


 


Random Glucose    208 H


 


Lactic Acid   


 


Calcium    10.6 H


 


Magnesium    1.5 L


 


Total Bilirubin    0.6


 


AST    20


 


ALT    19


 


Alkaline Phosphatase    48


 


Creatine Kinase    159


 


Creatine Kinase Index    3.2


 


CK-MB (CK-2)    5.1 H


 


Troponin I    1.12 H*


 


Total Protein    8.3 H


 


Albumin    4.7


 


Lipase    90


 


Urine Color   


 


Urine Appearance   


 


Urine pH   


 


Ur Specific Gravity   


 


Urine Protein   


 


Urine Glucose (UA)   


 


Urine Ketones   


 


Urine Blood   


 


Urine Nitrite   


 


Urine Bilirubin   


 


Urine Urobilinogen   


 


Ur Leukocyte Esterase   


 


Urine WBC (Auto)   


 


Urine RBC (Auto)   


 


Urine Casts (Auto)   


 


U Epithel Cells (Auto)   


 


Urine Bacteria (Auto)   


 


Blood Type   


 


Antibody Screen   














  07/10/20 07/10/20 07/11/20





  22:23 22:23 00:16


 


WBC   


 


RBC   


 


Hgb   


 


Hct   


 


MCV   


 


MCH   


 


MCHC   


 


RDW   


 


Plt Count   


 


MPV   


 


Absolute Neuts (auto)   


 


Neutrophils %   


 


Lymphocytes %   


 


Monocytes %   


 


Eosinophils %   


 


Basophils %   


 


Nucleated RBC %   


 


PT with INR   


 


INR   


 


PTT (Actin FS)   


 


Sodium   


 


Potassium   


 


Chloride   


 


Carbon Dioxide   


 


Anion Gap   


 


BUN   


 


Creatinine   


 


Est GFR (CKD-EPI)AfAm   


 


Est GFR (CKD-EPI)NonAf   


 


Random Glucose   


 


Lactic Acid  2.5 H*  


 


Calcium   


 


Magnesium   


 


Total Bilirubin   


 


AST   


 


ALT   


 


Alkaline Phosphatase   


 


Creatine Kinase   


 


Creatine Kinase Index   


 


CK-MB (CK-2)   


 


Troponin I   


 


Total Protein   


 


Albumin   


 


Lipase   


 


Urine Color    Yellow


 


Urine Appearance    Clear


 


Urine pH    6.5


 


Ur Specific Gravity    1.019


 


Urine Protein    3+ H


 


Urine Glucose (UA)    Trace


 


Urine Ketones    Negative


 


Urine Blood    1+ H


 


Urine Nitrite    Negative


 


Urine Bilirubin    Negative


 


Urine Urobilinogen    0.2


 


Ur Leukocyte Esterase    Negative


 


Urine WBC (Auto)    4


 


Urine RBC (Auto)    22


 


Urine Casts (Auto)    1


 


U Epithel Cells (Auto)    7


 


Urine Bacteria (Auto)    23


 


Blood Type   O POSITIVE 


 


Antibody Screen   Negative 














  07/11/20 07/11/20





  02:23 02:23


 


WBC  


 


RBC  


 


Hgb  


 


Hct  


 


MCV  


 


MCH  


 


MCHC  


 


RDW  


 


Plt Count  


 


MPV  


 


Absolute Neuts (auto)  


 


Neutrophils %  


 


Lymphocytes %  


 


Monocytes %  


 


Eosinophils %  


 


Basophils %  


 


Nucleated RBC %  


 


PT with INR  


 


INR  


 


PTT (Actin FS)  


 


Sodium  


 


Potassium  


 


Chloride  


 


Carbon Dioxide  


 


Anion Gap  


 


BUN  


 


Creatinine  


 


Est GFR (CKD-EPI)AfAm  


 


Est GFR (CKD-EPI)NonAf  


 


Random Glucose  


 


Lactic Acid  2.7 H* 


 


Calcium  


 


Magnesium  


 


Total Bilirubin  


 


AST  


 


ALT  


 


Alkaline Phosphatase  


 


Creatine Kinase  


 


Creatine Kinase Index  


 


CK-MB (CK-2)  


 


Troponin I   1.84 H*


 


Total Protein  


 


Albumin  


 


Lipase  


 


Urine Color  


 


Urine Appearance  


 


Urine pH  


 


Ur Specific Gravity  


 


Urine Protein  


 


Urine Glucose (UA)  


 


Urine Ketones  


 


Urine Blood  


 


Urine Nitrite  


 


Urine Bilirubin  


 


Urine Urobilinogen  


 


Ur Leukocyte Esterase  


 


Urine WBC (Auto)  


 


Urine RBC (Auto)  


 


Urine Casts (Auto)  


 


U Epithel Cells (Auto)  


 


Urine Bacteria (Auto)  


 


Blood Type  


 


Antibody Screen  








                                Home Medications











 Medication  Instructions  Recorded


 


Aspirin [Aspirin EC] 325 mg PO DAILY 10/22/15


 


Atenolol [Tenormin] 25 mg PO BID 10/22/15


 


Lisinopril [Prinivil -] 20 mg PO DAILY 10/22/15


 


Metformin HCl [Glucophage] 500 mg PO BID 10/22/15


 


Amlodipine Besylate [Norvasc -] 10 mg PO DAILY 01/30/20


 


Atorvastatin Calcium [Lipitor] 40 mg PO HS 01/30/20


 


Ergocalciferol (Vitamin D2) 1,000 unit PO DAILY 01/30/20





[Vitamin D2]  


 


Vitamin B12 100 mg PO DAILY 01/30/20














ASSESSMENT AND PLAN:


This is a 65 year old woman with a history of HTN, type 2 DM, RLS, sciatica, 

exploratory laparotomy for SBO who presented to the ED with abdominal pain, 

nausea and vomiting.





1. SBO


   - NG tube inserted in ED with improvement of symptoms


   - Conservative management with NGT, NPO, IV fluid


   - Surgery consult


2. Demand ischemia vs NSTEMI


   - Monitor on telemetry


   - Serial troponins, EKGs


   - Echocardiogram


   - Continue aspirin, atenolol, Lipitor


   - Start Plavix


   - Cardiology consult


3. Lactic acidosis


   - Cause not clear


   - No evidence of sepsis


   - IV fluid


   - Monitor lactic acid


4. Hyponatremia


   - IV fluid


   - Monitor electrolytes


5. Hypomagnesemia


   - Supplement magnesium


6. Proteinuria and hematuria


   - Probable stage 2 CKD/diabetic nephropathy


   - Renal US


   - Nephrology consult

## 2020-07-11 NOTE — PN
Progress Note (short form)





- Note


Progress Note: 


SUBJECTIVE


Seen and examined at bedside.  Patient reports abdominal pain/pressure is 

improved since insertion of the NG tube.  Troponins josefina from 1.1-1.8 last night

and decreased to 1.65.  Heparin drip started due to concern of an STEMI in the 

setting of history of CAD.





OBJECTIVE


                                Last Vital Signs











Temp Pulse Resp BP Pulse Ox


 


 99 F   56 L  20   134/76   96 


 


 07/11/20 14:25  07/11/20 14:25  07/11/20 14:25  07/11/20 14:25  07/11/20 09:00





PE: 


GEN: NAD


HEENT: NC/AT PUJA


RESP: CTAB


CARDS: RRR, -MRG


ABD: NGT in place, mildly distended,


EXT: no swelling/edema


NEURO: Non-focal





[age]-year-old female with a past medical history of hypertension, diabetes, CAD 

status post stents and 2008, multiple episodes of SBO, presents with SBO and 

elevated troponin.





#Small bowel obstruction


Surgery on board: Appreciate recommendations


Continue NG tube


N.p.o. except meds


Trend KUB





#Demand ischemia versus NSTEMI


Started on heparin drip


Cardiology on board: Appreciate recommendations


Hold clopidogrel given concern for possible need for surgery in setting of SBO


Continue aspirin, atenolol, Lipitor,


Telemetry


Troponins down trended





#Elevated lactic acid


Cause unclear, no signs of acute infection.  Possibly secondary to SBO or decrea

sed cardiac output in setting of N STEMI?


Continue fluids


Repeat lactic acid level





#Proteinuria and hematuria


Follow-up renal ultrasound


Repeat UA as an outpatient





#DVT: Heparin drip














Visit type





- Emergency Visit


Emergency Visit: Yes


ED Registration Date: 07/11/20


Care time: The patient presented to the Emergency Department on the above date 

and was hospitalized for further evaluation of their emergent condition.





- New Patient


This patient is new to me today: No





- Critical Care


Critical Care patient: No

## 2020-07-11 NOTE — EKG
Test Reason : 

Blood Pressure : ***/*** mmHG

Vent. Rate : 062 BPM     Atrial Rate : 062 BPM

   P-R Int : 154 ms          QRS Dur : 078 ms

    QT Int : 464 ms       P-R-T Axes : -18 013 043 degrees

   QTc Int : 470 ms

 

*** POOR DATA QUALITY, INTERPRETATION MAY BE ADVERSELY AFFECTED

NORMAL SINUS RHYTHM

NORMAL ECG

WHEN COMPARED WITH ECG OF 10-JUL-2020 21:43,

NO SIGNIFICANT CHANGE WAS FOUND

Confirmed by NIKHIL SESAY, RACHAEL ([id number]) on 7/11/2020 11:35:01 AM

 

Referred By:             Confirmed By:RACHAEL TEJEDA MD

## 2020-07-11 NOTE — CON.CARD
Cardiology Consult (text)





- Consultation


Consultation Note: 





cc: abd pain, vomiting





hpi:  65 f hx htn, dm, cad s/p pci 2008 (after +stress test, no MI), exlap for 

prior sbo, here with abd pain, vomiting.  Sxs for past few days.  No cp sob 

palps dizzy loc pnd orthopnea le edema.  Sees cardio at [facility].





pmh: per hpi


psh: per hpi


social: no tob


fam: no premature cad, scd


ros: per hpi; all others nl


meds:


                                Home Medications











 Medication  Instructions  Recorded


 


Aspirin [Aspirin EC] 325 mg PO DAILY 10/22/15


 


Atenolol [Tenormin] 25 mg PO BID 10/22/15


 


Lisinopril [Prinivil -] 20 mg PO DAILY 10/22/15


 


Metformin HCl [Glucophage] 500 mg PO BID 10/22/15


 


Amlodipine Besylate [Norvasc -] 10 mg PO DAILY 01/30/20


 


Atorvastatin Calcium [Lipitor] 40 mg PO HS 01/30/20


 


Ergocalciferol (Vitamin D2) 1,000 unit PO DAILY 01/30/20





[Vitamin D2]  


 


Vitamin B12 100 mg PO DAILY 01/30/20


 


Glimepiride 1 mg PO DAILY 07/11/20


 


Naproxen 500 mg PO Q12H PRN 07/11/20








                               Current Medications











Generic Name Dose Route Start Last Admin





  Trade Name Freq  PRN Reason Stop Dose Admin


 


Acetaminophen  650 mg  07/11/20 05:14 





  Tylenol -  PO  





  Q4H PRN  





  PAIN LEVEL 4 - 6  


 


Amlodipine Besylate  10 mg  07/11/20 10:00  07/11/20 09:54





  Norvasc -  PO   10 mg





  DAILY JULIA   Administration


 


Aspirin  81 mg  07/11/20 10:00  07/11/20 09:54





  Ecotrin -  PO   81 mg





  DAILY JULIA   Administration


 


Atenolol  25 mg  07/11/20 10:00  07/11/20 09:54





  Tenormin -  PO   25 mg





  BID JULIA   Administration


 


Atorvastatin Calcium  40 mg  07/11/20 22:00 





  Lipitor -  PO  





  [facility]  


 


Cholecalciferol  1,000 unit  07/11/20 10:00  07/11/20 09:54





  Vitamin D3 -  PO   1,000 unit





  DAILY JULIA   Administration


 


Cyanocobalamin  100 mcg  07/11/20 10:00  07/11/20 10:09





  Vitamin B12 -  PO   100 mcg





  DAILY JULIA   Administration


 


Heparin Sodium (Porcine)  1,000 unit  07/11/20 10:24 





  Heparin -  IVPUSH  





  PRN PRN  





  Heparin  


 


Heparin Sodium (Porcine)  5,000 unit  07/11/20 10:24 





  Heparin -  IVPUSH  





  PRN PRN  





  Heparin  


 


Sodium Chloride  1,000 mls @ 125 mls/hr  07/11/20 05:15  07/11/20 06:11





  Normal Saline -  IV   125 mls/hr





  ASDIR JULIA   Administration


 


HEPARIN SOD,PORK IN 0.45% NACL  25,000 units in 500 mls @ 20 mls/hr  07/11/20 

10:30 





  Heparin-1/2ns 25,000 Units/500  IVPB  





  TITR JULIA  





  Protocol  





  1,000 UNITS/HR  


 


Insulin Aspart  1 vial  07/11/20 07:00  07/11/20 06:46





  Novolog Vial Sliding Scale -  SQ   Not Given





  [facility]  





  Protocol  


 


Lisinopril  20 mg  07/11/20 10:00  07/11/20 09:54





  Prinivil  PO   20 mg





  DAILY JULIA   Administration








pe:


                                   Vital Signs











 Period  Temp  Pulse  Resp  BP Sys/Vasquez  Pulse Ox


 


 Last 24 Hr  98 F-99.9 F  52-64  16-20  136-184/67-80  95-99








nad no jvd


rrr s1s2 no mrg


cta bl nl eff


aao3


no le e/c/c


abd nd +mild diffuse tender


pos dp pt no carotid bruits


no jaundice diaphoresis





                             Laboratory Last Values











WBC  7.7 K/mm3 (4.0-10.0)   07/11/20  05:35    


 


RBC  4.68 M/mm3 (3.60-5.2)   07/11/20  05:35    


 


Hgb  12.3 GM/dL (10.7-15.3)   07/11/20  05:35    


 


Hct  38.3 % (32.4-45.2)   07/11/20  05:35    


 


MCV  81.8 fl (80-96)   07/11/20  05:35    


 


MCH  26.4 pg (25.7-33.7)   07/11/20  05:35    


 


MCHC  32.2 g/dl (32.0-36.0)   07/11/20  05:35    


 


RDW  13.9 % (11.6-15.6)   07/11/20  05:35    


 


Plt Count  255 K/MM3 (134-434)   07/11/20  05:35    


 


MPV  8.4 fl (7.5-11.1)   07/11/20  05:35    


 


Absolute Neuts (auto)  7.6 K/mm3 (1.5-8.0)   07/10/20  22:23    


 


Neutrophils %  79.0 % (42.8-82.8)  D 07/10/20  22:23    


 


Lymphocytes %  16.2 % (8-40)  D 07/10/20  22:23    


 


Monocytes %  4.3 % (3.8-10.2)   07/10/20  22:23    


 


Eosinophils %  0.0 % (0-4.5)  D 07/10/20  22:23    


 


Basophils %  0.5 % (0-2.0)   07/10/20  22:23    


 


Nucleated RBC %  0 % (0-0)   07/10/20  22:23    


 


PT with INR  13.10 SEC (9.7-13.0)  H  07/10/20  22:23    


 


INR  1.11  (0.83-1.09)  H  07/10/20  22:23    


 


PTT (Actin FS)  29.7 SECONDS (25.2-36.5)   07/10/20  22:23    


 


Sodium  132 mmol/L (136-145)  L  07/11/20  05:35    


 


Potassium  3.9 mmol/L (3.5-5.1)   07/11/20  05:35    


 


Chloride  94 mmol/L ()  L  07/11/20  05:35    


 


Carbon Dioxide  30 mmol/L (21-32)   07/11/20  05:35    


 


Anion Gap  8 MMOL/L (8-16)   07/11/20  05:35    


 


BUN  15.5 mg/dL (7-18)   07/11/20  05:35    


 


Creatinine  1.0 mg/dL (0.55-1.3)   07/11/20  05:35    


 


Est GFR (CKD-EPI)AfAm  67.51   07/11/20  05:35    


 


Est GFR (CKD-EPI)NonAf  58.25   07/11/20  05:35    


 


Random Glucose  165 mg/dL ()  H  07/11/20  05:35    


 


Lactic Acid  2.6 mmol/L (0.4-2.0)  H*  07/11/20  05:35    


 


Calcium  9.5 mg/dL (8.5-10.1)   07/11/20  05:35    


 


Phosphorus  3.5 mg/dL (2.5-4.9)   07/11/20  05:35    


 


Magnesium  2.1 mg/dL (1.8-2.4)   07/11/20  05:35    


 


Total Bilirubin  0.5 mg/dL (0.2-1)   07/11/20  05:35    


 


AST  19 U/L (15-37)   07/11/20  05:35    


 


ALT  15 U/L (13-61)   07/11/20  05:35    


 


Alkaline Phosphatase  41 U/L ()  L  07/11/20  05:35    


 


Creatine Kinase  159 U/L ()   07/10/20  22:23    


 


Creatine Kinase Index  3.2 % (0.0-5.0)   07/10/20  22:23    


 


CK-MB (CK-2)  5.1 ng/mL (0.5-3.6)  H  07/10/20  22:23    


 


Troponin I  1.65 ng/ml (0.00-0.05)  H*  07/11/20  05:35    


 


Total Protein  6.9 g/dl (6.4-8.2)   07/11/20  05:35    


 


Albumin  3.9 g/dl (3.4-5.0)   07/11/20  05:35    


 


Lipase  90 U/L ()   07/10/20  22:23    


 


Urine Color  Yellow   07/11/20  00:16    


 


Urine Appearance  Clear   07/11/20  00:16    


 


Urine pH  6.5  (5.0-8.0)   07/11/20  00:16    


 


Ur Specific Gravity  1.019  (1.010-1.035)   07/11/20  00:16    


 


Urine Protein  3+  (NEGATIVE)  H  07/11/20  00:16    


 


Urine Glucose (UA)  Trace  (NEGATIVE)   07/11/20  00:16    


 


Urine Ketones  Negative  (NEGATIVE)   07/11/20  00:16    


 


Urine Blood  1+  (NEGATIVE)  H  07/11/20  00:16    


 


Urine Nitrite  Negative  (NEGATIVE)   07/11/20  00:16    


 


Urine Bilirubin  Negative  (NEGATIVE)   07/11/20  00:16    


 


Urine Urobilinogen  0.2 mg/dL (0.2-1.0)   07/11/20  00:16    


 


Ur Leukocyte Esterase  Negative  (NEGATIVE)   07/11/20  00:16    


 


Urine WBC (Auto)  4 /uL (0-25.8)   07/11/20  00:16    


 


Urine RBC (Auto)  22 /uL (0-23.9)   07/11/20  00:16    


 


Urine Casts (Auto)  1 /uL (0-3.1)   07/11/20  00:16    


 


U Epithel Cells (Auto)  7 /uL (0-25.1)   07/11/20  00:16    


 


Urine Bacteria (Auto)  23 /uL (0-1359)   07/11/20  00:16    


 


Blood Type  O POSITIVE   07/10/20  22:23    


 


Antibody Screen  Negative   07/10/20  22:23    








tele: sr





ecg: wnl





cxr: clear 








a/p:  65 f hx htn, dm, cad s/p pci 2008 (after +stress test, no MI), exlap for 

prior sbo, here with abd pain, vomiting.





cad s/p remote pci, now with +trops/nstemi:


-pt has no cardiac sxs but trop mildly elevated, peaked at 1.8.  ECG 

unremarkable.  Possible demand ischemia from acute issues but given cad hx 

possibly nstemi as well.  Agree with hep gtt for 48-72 hrs (started 7/11 AM).  

Cont asa, statin, bb, ace.  Would hold off on plavix given her current sbo in 

case urgent surgery becomes necessary.


-check echo, cont tele


-will need ischemic eval, most likely with nuclear stress test, when acute 

issues resolved





htn:


-cont current meds





hld:


-cont statin





sbo:


-surgery following, attempting conservative management for now

## 2020-07-12 LAB
ANION GAP SERPL CALC-SCNC: 8 MMOL/L (ref 8–16)
BASOPHILS # BLD: 1.2 % (ref 0–2)
BUN SERPL-MCNC: 12.6 MG/DL (ref 7–18)
CALCIUM SERPL-MCNC: 9.1 MG/DL (ref 8.5–10.1)
CHLORIDE SERPL-SCNC: 105 MMOL/L (ref 98–107)
CO2 SERPL-SCNC: 27 MMOL/L (ref 21–32)
CREAT SERPL-MCNC: 0.8 MG/DL (ref 0.55–1.3)
DEPRECATED RDW RBC AUTO: 13.8 % (ref 11.6–15.6)
DEPRECATED RDW RBC AUTO: 14 % (ref 11.6–15.6)
EOSINOPHIL # BLD: 2.2 % (ref 0–4.5)
GLUCOSE SERPL-MCNC: 118 MG/DL (ref 74–106)
HCT VFR BLD CALC: 35.2 % (ref 32.4–45.2)
HCT VFR BLD CALC: 37.7 % (ref 32.4–45.2)
HGB BLD-MCNC: 11.3 GM/DL (ref 10.7–15.3)
HGB BLD-MCNC: 12.1 GM/DL (ref 10.7–15.3)
LYMPHOCYTES # BLD: 31.1 % (ref 8–40)
MCH RBC QN AUTO: 26.4 PG (ref 25.7–33.7)
MCH RBC QN AUTO: 26.5 PG (ref 25.7–33.7)
MCHC RBC AUTO-ENTMCNC: 32.1 G/DL (ref 32–36)
MCHC RBC AUTO-ENTMCNC: 32.2 G/DL (ref 32–36)
MCV RBC: 82.2 FL (ref 80–96)
MCV RBC: 82.4 FL (ref 80–96)
MONOCYTES # BLD AUTO: 9.5 % (ref 3.8–10.2)
NEUTROPHILS # BLD: 56 % (ref 42.8–82.8)
PLATELET # BLD AUTO: 213 K/MM3 (ref 134–434)
PLATELET # BLD AUTO: 229 K/MM3 (ref 134–434)
PMV BLD: 7.8 FL (ref 7.5–11.1)
PMV BLD: 8.6 FL (ref 7.5–11.1)
POTASSIUM SERPLBLD-SCNC: 3.4 MMOL/L (ref 3.5–5.1)
RBC # BLD AUTO: 4.29 M/MM3 (ref 3.6–5.2)
RBC # BLD AUTO: 4.57 M/MM3 (ref 3.6–5.2)
SODIUM SERPL-SCNC: 141 MMOL/L (ref 136–145)
WBC # BLD AUTO: 5.7 K/MM3 (ref 4–10)
WBC # BLD AUTO: 6.8 K/MM3 (ref 4–10)

## 2020-07-12 RX ADMIN — INSULIN ASPART SCH: 100 INJECTION, SOLUTION INTRAVENOUS; SUBCUTANEOUS at 17:14

## 2020-07-12 RX ADMIN — INSULIN ASPART SCH: 100 INJECTION, SOLUTION INTRAVENOUS; SUBCUTANEOUS at 12:27

## 2020-07-12 RX ADMIN — POTASSIUM CHLORIDE SCH MLS/HR: 7.46 INJECTION, SOLUTION INTRAVENOUS at 14:20

## 2020-07-12 RX ADMIN — ACETAMINOPHEN PRN MG: 325 TABLET ORAL at 18:17

## 2020-07-12 RX ADMIN — SODIUM CHLORIDE SCH MLS/HR: 9 INJECTION, SOLUTION INTRAVENOUS at 00:28

## 2020-07-12 RX ADMIN — SODIUM CHLORIDE SCH MLS/HR: 9 INJECTION, SOLUTION INTRAVENOUS at 06:34

## 2020-07-12 RX ADMIN — ATORVASTATIN CALCIUM SCH MG: 40 TABLET, FILM COATED ORAL at 22:41

## 2020-07-12 RX ADMIN — LISINOPRIL SCH MG: 20 TABLET ORAL at 11:00

## 2020-07-12 RX ADMIN — AMLODIPINE BESYLATE SCH MG: 10 TABLET ORAL at 11:00

## 2020-07-12 RX ADMIN — POTASSIUM CHLORIDE SCH MLS/HR: 7.46 INJECTION, SOLUTION INTRAVENOUS at 13:05

## 2020-07-12 RX ADMIN — INSULIN ASPART SCH: 100 INJECTION, SOLUTION INTRAVENOUS; SUBCUTANEOUS at 06:12

## 2020-07-12 RX ADMIN — POTASSIUM CHLORIDE SCH MLS/HR: 7.46 INJECTION, SOLUTION INTRAVENOUS at 10:58

## 2020-07-12 RX ADMIN — ATENOLOL SCH MG: 25 TABLET ORAL at 23:18

## 2020-07-12 RX ADMIN — VITAMIN D, TAB 1000IU (100/BT) SCH UNIT: 25 TAB at 11:00

## 2020-07-12 RX ADMIN — INSULIN ASPART SCH: 100 INJECTION, SOLUTION INTRAVENOUS; SUBCUTANEOUS at 22:03

## 2020-07-12 RX ADMIN — ASPIRIN SCH: 81 TABLET, COATED ORAL at 10:55

## 2020-07-12 RX ADMIN — PANTOPRAZOLE SODIUM SCH MG: 40 INJECTION, POWDER, FOR SOLUTION INTRAVENOUS at 22:17

## 2020-07-12 RX ADMIN — VITAM B12 SCH MCG: 100 TAB at 11:24

## 2020-07-12 RX ADMIN — ATENOLOL SCH MG: 25 TABLET ORAL at 11:00

## 2020-07-12 RX ADMIN — ATENOLOL SCH: 25 TABLET ORAL at 22:50

## 2020-07-12 NOTE — PN
Progress Note (short form)





- Note


Progress Note: 


s: no cp sob palps dizzy





                               Current Medications











Generic Name Dose Route Start Last Admin





  Trade Name Freq  PRN Reason Stop Dose Admin


 


Acetaminophen  650 mg  07/11/20 05:14  07/11/20 20:19





  Tylenol -  PO   650 mg





  Q4H PRN   Administration





  PAIN LEVEL 4 - 6  


 


Amlodipine Besylate  10 mg  07/11/20 10:00  07/11/20 09:54





  Norvasc -  PO   10 mg





  DAILY JULIA   Administration


 


Aspirin  81 mg  07/11/20 10:00  07/11/20 09:54





  Ecotrin -  PO   81 mg





  DAILY JULIA   Administration


 


Atenolol  25 mg  07/11/20 10:00  07/11/20 22:40





  Tenormin -  PO   25 mg





  BID JULIA   Administration


 


Atorvastatin Calcium  40 mg  07/11/20 22:00  07/11/20 22:40





  Lipitor -  PO   40 mg





  HS JULIA   Administration


 


Cholecalciferol  1,000 unit  07/11/20 10:00  07/11/20 09:54





  Vitamin D3 -  PO   1,000 unit





  DAILY JULIA   Administration


 


Cyanocobalamin  100 mcg  07/11/20 10:00  07/11/20 10:09





  Vitamin B12 -  PO   100 mcg





  DAILY JULIA   Administration


 


Sodium Chloride  1,000 mls @ 125 mls/hr  07/11/20 05:15  07/12/20 06:34





  Normal Saline -  IV   125 mls/hr





  ASDIR JULIA   Administration


 


Potassium Chloride  10 meq in 100 mls @ 100 mls/hr  07/12/20 08:30 





  Potassium Chloride 10 Meq Premix Ivpb -  IVPB  07/12/20 11:29 





  Q60M Counts include 234 beds at the [facility]  


 


Insulin Aspart  1 vial  07/11/20 07:00  07/12/20 06:12





  Novolog Vial Sliding Scale -  SQ   Not Given





  [facility] Counts include 234 beds at the [facility]  





  Protocol  


 


Lisinopril  20 mg  07/11/20 10:00  07/11/20 09:54





  Prinivil  PO   20 mg





  DAILY JULIA   Administration











pe:


                                  Vital Signs











 Period  Temp  Pulse  Resp  BP Sys/Vasquez  Pulse Ox


 


 Last 24 Hr  97.2 F-99.6 F  48-60  16-20  133-149/51-76  96








nad no jvd


rrr s1s2 no mrg


cta bl nl eff


aao3


no le e/c/c


abd nd nt


no jaundice diaphoresis





                                    CBC, BMP





                                 07/12/20 05:40 





                                 07/12/20 05:40 








tele: sr





ecg: wnl





cxr: clear 








a/p:  65 f hx htn, dm, cad s/p pci 2008 (after +stress test, no MI), exlap for 

prior sbo, here with abd pain, vomiting.





cad s/p remote pci, now with +trops/nstemi:


-pt has no cardiac sxs but trop mildly elevated, peaked at 1.8.  ECG 

unremarkable.  Possible demand ischemia from acute issues but given cad hx 

possibly nstemi as well.  Started hep gtt 7/11 but then had coffee ground fluids

in ngt so hep gtt stopped.   Cont statin, bb, ace. 


-check echo, cont tele


-will need ischemic eval, most likely with nuclear stress test, when acute 

issues resolved





htn:


-cont current meds





hld:


-cont statin





sbo:


-surgery following, attempting conservative management for now

## 2020-07-12 NOTE — PN
Progress Note (short form)





- Note


Progress Note: 





Attending Surgeon





Seen in f/u; states she passed a small amount of flatus; no BM; NGT came out 

yesterday and was replaced; on heparin gtt.





VSS AF





abdo-soft; nontender; minimally distended and tympanitic





NGT-600 cc





AXR's reviewed





IMP: sbo





PLAN: Continue NPO/NGT/no films for today; will repeat 07/13/2020; NGT care d/w 

the patients nurse and orders placed.





Pedro Luis Avalos MD FACS

## 2020-07-12 NOTE — PN
Physical Exam: 


SUBJECTIVE: Patient seen and examined at bedside. Coffee ground emesis 

overnight. Heparin drip held. 








OBJECTIVE:





                                   Vital Signs











 Period  Temp  Pulse  Resp  BP Sys/Vasquez  Pulse Ox


 


 Last 24 Hr  97.2 F-99.7 F  48-60  16-20  133-156/51-75  96-98











GENERAL: NAD NGT in place.


HEAD: Normal with no signs of trauma.


EYES: EOMI Sclera Clear


LUNGS: Clear


HEART: RRR S1S2 No MRG


ABDOMEN: Ex-LAP Surgical Scar. Distended. BS appreciated. No guarding or 

rigidity. 


EXTREMITIES: No Sig Edema appreciated 


PSYCH: Normal mood, normal affect.

















                         Laboratory Results - last 24 hr











  07/11/20 07/11/20 07/11/20





  15:20 16:38 17:37


 


WBC   


 


RBC   


 


Hgb   


 


Hct   


 


MCV   


 


MCH   


 


MCHC   


 


RDW   


 


Plt Count   


 


MPV   


 


Absolute Neuts (auto)   


 


Neutrophils %   


 


Lymphocytes %   


 


Monocytes %   


 


Eosinophils %   


 


Basophils %   


 


Nucleated RBC %   


 


PTT (Actin FS)    107.3 H


 


Sodium   


 


Potassium   


 


Chloride   


 


Carbon Dioxide   


 


Anion Gap   


 


BUN   


 


Creatinine   


 


Est GFR (CKD-EPI)AfAm   


 


Est GFR (CKD-EPI)NonAf   


 


POC Glucometer   143 


 


Random Glucose   


 


Lactic Acid   


 


Calcium   


 


Troponin I  1.30 H*  


 


Gastric Occult Blood   














  07/11/20 07/11/20 07/11/20





  17:37 21:53 23:15


 


WBC   


 


RBC   


 


Hgb   


 


Hct   


 


MCV   


 


MCH   


 


MCHC   


 


RDW   


 


Plt Count   


 


MPV   


 


Absolute Neuts (auto)   


 


Neutrophils %   


 


Lymphocytes %   


 


Monocytes %   


 


Eosinophils %   


 


Basophils %   


 


Nucleated RBC %   


 


PTT (Actin FS)   


 


Sodium   


 


Potassium   


 


Chloride   


 


Carbon Dioxide   


 


Anion Gap   


 


BUN   


 


Creatinine   


 


Est GFR (CKD-EPI)AfAm   


 


Est GFR (CKD-EPI)NonAf   


 


POC Glucometer   


 


Random Glucose   


 


Lactic Acid  1.6  


 


Calcium   


 


Troponin I   Cancelled  1.14 H*


 


Gastric Occult Blood   














  07/12/20 07/12/20 07/12/20





  01:30 05:40 05:40


 


WBC   5.7 


 


RBC   4.29 


 


Hgb   11.3 


 


Hct   35.2 


 


MCV   82.2 


 


MCH   26.4 


 


MCHC   32.1 


 


RDW   13.8 


 


Plt Count   213 


 


MPV   8.6 


 


Absolute Neuts (auto)   3.2 


 


Neutrophils %   56.0  D 


 


Lymphocytes %   31.1  D 


 


Monocytes %   9.5  D 


 


Eosinophils %   2.2  D 


 


Basophils %   1.2 


 


Nucleated RBC %   0 


 


PTT (Actin FS)  81.2 H  


 


Sodium    141


 


Potassium    3.4 L


 


Chloride    105


 


Carbon Dioxide    27


 


Anion Gap    8


 


BUN    12.6


 


Creatinine    0.8


 


Est GFR (CKD-EPI)AfAm    88.42


 


Est GFR (CKD-EPI)NonAf    76.29


 


POC Glucometer   


 


Random Glucose    118 H


 


Lactic Acid   


 


Calcium    9.1


 


Troponin I   


 


Gastric Occult Blood   














  07/12/20 07/12/20 07/12/20





  05:44 08:30 08:30


 


WBC   


 


RBC   


 


Hgb   


 


Hct   


 


MCV   


 


MCH   


 


MCHC   


 


RDW   


 


Plt Count   


 


MPV   


 


Absolute Neuts (auto)   


 


Neutrophils %   


 


Lymphocytes %   


 


Monocytes %   


 


Eosinophils %   


 


Basophils %   


 


Nucleated RBC %   


 


PTT (Actin FS)   28.9 


 


Sodium   


 


Potassium   


 


Chloride   


 


Carbon Dioxide   


 


Anion Gap   


 


BUN   


 


Creatinine   


 


Est GFR (CKD-EPI)AfAm   


 


Est GFR (CKD-EPI)NonAf   


 


POC Glucometer  116  


 


Random Glucose   


 


Lactic Acid   


 


Calcium   


 


Troponin I   


 


Gastric Occult Blood    Positive














  07/12/20





  11:51


 


WBC 


 


RBC 


 


Hgb 


 


Hct 


 


MCV 


 


MCH 


 


MCHC 


 


RDW 


 


Plt Count 


 


MPV 


 


Absolute Neuts (auto) 


 


Neutrophils % 


 


Lymphocytes % 


 


Monocytes % 


 


Eosinophils % 


 


Basophils % 


 


Nucleated RBC % 


 


PTT (Actin FS) 


 


Sodium 


 


Potassium 


 


Chloride 


 


Carbon Dioxide 


 


Anion Gap 


 


BUN 


 


Creatinine 


 


Est GFR (CKD-EPI)AfAm 


 


Est GFR (CKD-EPI)NonAf 


 


POC Glucometer  133


 


Random Glucose 


 


Lactic Acid 


 


Calcium 


 


Troponin I 


 


Gastric Occult Blood 








Active Medications











Generic Name Dose Route Start Last Admin





  Trade Name Freq  PRN Reason Stop Dose Admin


 


Acetaminophen  650 mg  07/11/20 05:14  07/11/20 20:19





  Tylenol -  PO   650 mg





  Q4H PRN   Administration





  PAIN LEVEL 4 - 6  


 


Amlodipine Besylate  10 mg  07/11/20 10:00  07/12/20 11:00





  Norvasc -  PO   10 mg





  DAILY JULIA   Administration


 


Aspirin  81 mg  07/11/20 10:00  07/12/20 10:55





  Ecotrin -  PO   Not Given





  DAILY JULIA  


 


Atenolol  25 mg  07/11/20 10:00  07/12/20 11:00





  Tenormin -  PO   25 mg





  BID JULIA   Administration


 


Atorvastatin Calcium  40 mg  07/11/20 22:00  07/11/20 22:40





  Lipitor -  PO   40 mg





  HS JULIA   Administration


 


Cholecalciferol  1,000 unit  07/11/20 10:00  07/12/20 11:00





  Vitamin D3 -  PO   1,000 unit





  DAILY JULIA   Administration


 


Cyanocobalamin  100 mcg  07/11/20 10:00  07/11/20 10:09





  Vitamin B12 -  PO   100 mcg





  DAILY JULIA   Administration


 


Sodium Chloride  1,000 mls @ 125 mls/hr  07/11/20 05:15  07/12/20 06:34





  Normal Saline -  IV   125 mls/hr





  ASDIR JULIA   Administration


 


Insulin Aspart  1 vial  07/11/20 07:00  07/12/20 12:27





  Novolog Vial Sliding Scale -  SQ   Not Given





  [facility] JULIA  





  Protocol  


 


Lisinopril  20 mg  07/11/20 10:00  07/12/20 11:00





  Prinivil  PO   20 mg





  DAILY JULIA   Administration


 


Pantoprazole Sodium  40 mg  07/12/20 22:00 





  Protonix Iv  IVPUSH  





  BID JULIA  











ASSESSMENT/PLAN:


65-year-old female with a past medical history of hypertension, diabetes, CAD 

status post stents and 2008, multiple episodes of SBO, presents with SBO and 

elevated troponin.





#SBO


Surgery on board Dr Avalos----> Continue NPO/NGT/no films for today; will 

repeat 07/13/2020; 


N.p.o. except meds








#Demand ischemia versus NSTEMI


 Coffee-ground emesis overnight. Heparin drip stopped


Patient will require Stress MIBI once acute issues resolved


Cardiology on board----> pt has no cardiac sxs but trop mildly elevated, peaked 

at 1.8.  ECG unremarkable.  Possible demand ischemia from acute issues but given

cad hx possibly nstemi as well. Cont statin, bb, ace. Check echo, cont tele


Hold clopidogrel 





#Coffee ground emesis


+ gastric occult blood


-f/u CBC in afternoon


-stop heparin drip


-start PPI BID IV





#HTN


-Atenolol, Lisinopril, Amlidpine continue








#DM


ISS


-Hold oral hypoglycemics 





#Proteinuria and hematuria


Follow-up renal ultrasound


Repeat UA as an outpatient





#DVT ppx: 


Heparin drip Stopped 








Visit type





- Emergency Visit


Emergency Visit: Yes


ED Registration Date: 07/11/20


Care time: The patient presented to the Emergency Department on the above date 

and was hospitalized for further evaluation of their emergent condition.





- New Patient


This patient is new to me today: No





- Critical Care


Critical Care patient: No





- Discharge Referral


Referred to [facility] No





ATTENDING PHYSICIAN STATEMENT





I saw and evaluated the patient.


I reviewed the resident's note and discussed the case with the resident.


I agree with the resident's findings and plan as documented.








SUBJECTIVE:








OBJECTIVE:








ASSESSMENT AND PLAN:

## 2020-07-12 NOTE — PN
Teaching Attending Note


Name of Resident: Tre Perez





ATTENDING PHYSICIAN STATEMENT





I saw and evaluated the patient.


I reviewed the resident's note and discussed the case with the resident.


I agree with the resident's findings and plan as documented.








SUBJECTIVE


Seen and examined at bedside.  Appears tired today.  Overnight patient noted to 

have coffee-ground output through NG tube.  Gastric occult blood came back 

positive.  Heparin drip stopped.  Currently continuing conservative management


OBJECTIVE


                                Last Vital Signs











Temp Pulse Resp BP Pulse Ox


 


 99.6 F   48 L  18   143/67   96 


 


 07/12/20 09:25  07/12/20 09:25  07/12/20 09:25  07/12/20 09:25  07/11/20 21:00











PE: 


GEN: NAD


HEENT: NC/AT PUJA


RESP: CTAB


CARDS: RRR, -MRG


ABD: NGT in place, mildly distended,


EXT: no swelling/edema


NEURO: Non-focal





[age]-year-old female with a past medical history of hypertension, diabetes, CAD 

status post stents and 2008, multiple episodes of SBO, presents with SBO and 

elevated troponin.





#Small bowel obstruction


Surgery on board: Appreciate recommendations


Continue NG tube


N.p.o. except meds


Trend KUB





#Demand ischemia versus NSTEMI


Started on heparin dripBut developed coffee-ground emesis.  Heparin drip 

stopped


Patient will require nuclear stress test once acute issues resolved


Cardiology on board: Appreciate recommendations


Hold clopidogrel 


Continue aspirin, atenolol, Lipitor,


Telemetry


Troponins down trended





#Coffee ground emesis


+ gastric occult blood


-f/u CBC in afternoon


-stop heparin drip


-start PPI BID IV





#Elevated lactic acid


Cause unclear, no signs of acute infection.  Possibly secondary to SBO or 

decreased cardiac output in setting of N STEMI?


Continue fluids


Repeat lactic acid level





#Proteinuria and hematuria


Follow-up renal ultrasound


Repeat UA as an outpatient





#DVT: Heparin drip

## 2020-07-13 LAB
ALBUMIN SERPL-MCNC: 3.2 G/DL (ref 3.4–5)
ALP SERPL-CCNC: 32 U/L (ref 45–117)
ALT SERPL-CCNC: 11 U/L (ref 13–61)
ANION GAP SERPL CALC-SCNC: 10 MMOL/L (ref 8–16)
AST SERPL-CCNC: 12 U/L (ref 15–37)
BILIRUB SERPL-MCNC: 0.6 MG/DL (ref 0.2–1)
BUN SERPL-MCNC: 11.7 MG/DL (ref 7–18)
CALCIUM SERPL-MCNC: 8.6 MG/DL (ref 8.5–10.1)
CHLORIDE SERPL-SCNC: 105 MMOL/L (ref 98–107)
CO2 SERPL-SCNC: 26 MMOL/L (ref 21–32)
CREAT SERPL-MCNC: 0.7 MG/DL (ref 0.55–1.3)
DEPRECATED RDW RBC AUTO: 13.8 % (ref 11.6–15.6)
GLUCOSE SERPL-MCNC: 85 MG/DL (ref 74–106)
HCT VFR BLD CALC: 35.1 % (ref 32.4–45.2)
HGB BLD-MCNC: 11.2 GM/DL (ref 10.7–15.3)
MAGNESIUM SERPL-MCNC: 1.5 MG/DL (ref 1.8–2.4)
MCH RBC QN AUTO: 26.5 PG (ref 25.7–33.7)
MCHC RBC AUTO-ENTMCNC: 32 G/DL (ref 32–36)
MCV RBC: 82.9 FL (ref 80–96)
PHOSPHATE SERPL-MCNC: 2.4 MG/DL (ref 2.5–4.9)
PLATELET # BLD AUTO: 202 K/MM3 (ref 134–434)
PMV BLD: 8.5 FL (ref 7.5–11.1)
POTASSIUM SERPLBLD-SCNC: 3.5 MMOL/L (ref 3.5–5.1)
PROT SERPL-MCNC: 5.7 G/DL (ref 6.4–8.2)
RBC # BLD AUTO: 4.24 M/MM3 (ref 3.6–5.2)
SODIUM SERPL-SCNC: 140 MMOL/L (ref 136–145)
WBC # BLD AUTO: 7.1 K/MM3 (ref 4–10)

## 2020-07-13 RX ADMIN — ATENOLOL SCH MG: 25 TABLET ORAL at 21:34

## 2020-07-13 RX ADMIN — VITAMIN D, TAB 1000IU (100/BT) SCH UNIT: 25 TAB at 10:36

## 2020-07-13 RX ADMIN — INSULIN ASPART SCH: 100 INJECTION, SOLUTION INTRAVENOUS; SUBCUTANEOUS at 16:52

## 2020-07-13 RX ADMIN — AMLODIPINE BESYLATE SCH MG: 10 TABLET ORAL at 10:36

## 2020-07-13 RX ADMIN — PANTOPRAZOLE SODIUM SCH MG: 40 INJECTION, POWDER, FOR SOLUTION INTRAVENOUS at 10:37

## 2020-07-13 RX ADMIN — SODIUM CHLORIDE SCH MLS/HR: 9 INJECTION, SOLUTION INTRAVENOUS at 13:08

## 2020-07-13 RX ADMIN — ATORVASTATIN CALCIUM SCH MG: 40 TABLET, FILM COATED ORAL at 21:34

## 2020-07-13 RX ADMIN — INSULIN ASPART SCH: 100 INJECTION, SOLUTION INTRAVENOUS; SUBCUTANEOUS at 12:15

## 2020-07-13 RX ADMIN — INSULIN ASPART SCH: 100 INJECTION, SOLUTION INTRAVENOUS; SUBCUTANEOUS at 21:17

## 2020-07-13 RX ADMIN — ATENOLOL SCH: 25 TABLET ORAL at 09:35

## 2020-07-13 RX ADMIN — VITAM B12 SCH MCG: 100 TAB at 13:06

## 2020-07-13 RX ADMIN — LISINOPRIL SCH MG: 20 TABLET ORAL at 10:36

## 2020-07-13 RX ADMIN — SODIUM CHLORIDE SCH MLS/HR: 9 INJECTION, SOLUTION INTRAVENOUS at 06:13

## 2020-07-13 RX ADMIN — INSULIN ASPART SCH: 100 INJECTION, SOLUTION INTRAVENOUS; SUBCUTANEOUS at 06:13

## 2020-07-13 RX ADMIN — PANTOPRAZOLE SODIUM SCH MG: 40 INJECTION, POWDER, FOR SOLUTION INTRAVENOUS at 21:34

## 2020-07-13 RX ADMIN — ASPIRIN SCH: 81 TABLET, COATED ORAL at 09:35

## 2020-07-13 RX ADMIN — BENZOCAINE AND MENTHOL PRN EACH: 15; 3.6 LOZENGE ORAL at 16:46

## 2020-07-13 NOTE — PN
Progress Note (short form)





- Note


Progress Note: 


s: no cp sob palps dizzy





                              Current Medications











Generic Name Dose Route Start Last Admin





  Trade Name Freq  PRN Reason Stop Dose Admin


 


Acetaminophen  650 mg  07/11/20 05:14  07/12/20 18:17





  Tylenol -  PO   650 mg





  Q4H PRN   Administration





  PAIN LEVEL 4 - 6  


 


Amlodipine Besylate  10 mg  07/11/20 10:00  07/12/20 11:00





  Norvasc -  PO   10 mg





  DAILY JULIA   Administration


 


Aspirin  81 mg  07/11/20 10:00  07/13/20 09:35





  Ecotrin -  PO   Not Given





  DAILY JULIA  


 


Atenolol  25 mg  07/11/20 10:00  07/13/20 09:35





  Tenormin -  PO   Not Given





  BID JULIA  


 


Atorvastatin Calcium  40 mg  07/11/20 22:00  07/12/20 22:41





  Lipitor -  PO   40 mg





  HS JULIA   Administration


 


Cholecalciferol  1,000 unit  07/11/20 10:00  07/12/20 11:00





  Vitamin D3 -  PO   1,000 unit





  DAILY JULIA   Administration


 


Cyanocobalamin  100 mcg  07/11/20 10:00  07/12/20 11:24





  Vitamin B12 -  PO   100 mcg





  DAILY JULIA   Administration


 


Sodium Chloride  1,000 mls @ 125 mls/hr  07/11/20 05:15  07/13/20 06:13





  Normal Saline -  IV   125 mls/hr





  ASDIR JULIA   Administration


 


Insulin Aspart  1 vial  07/11/20 07:00  07/13/20 06:13





  Novolog Vial Sliding Scale -  SQ   Not Given





  [facility]  





  Protocol  


 


Lisinopril  20 mg  07/11/20 10:00  07/12/20 11:00





  Prinivil  PO   20 mg





  DAILY JULIA   Administration


 


Pantoprazole Sodium  40 mg  07/12/20 22:00  07/12/20 22:17





  Protonix Iv  IVPUSH   40 mg





  BID JULIA   Administration


 


Tramadol HCl  50 mg  07/12/20 19:10  07/12/20 22:17





  Ultram -  PO   50 mg





  Q8H PRN   Administration





  PAIN LEVEL 7 - 10  














pe:


                                        


                                   Vital Signs











 Period  Temp  Pulse  Resp  BP Sys/Vasquez  Pulse Ox


 


 Last 24 Hr  97.9 F-99.7 F  45-56  16-20  138-175/57-86  95-97











nad no jvd


rrr s1s2 no mrg


cta bl nl eff


aao3


no le e/c/c


abd nd nt


no jaundice diaphoresis





                                        


                                    CBC, BMP





                                 07/13/20 05:51 





                                 07/13/20 05:51 











tele: sr





ecg: wnl





cxr: clear 








a/p:  65 f hx htn, dm, cad s/p pci 2008 (after +stress test, no MI), exlap for 

prior sbo, here with abd pain, vomiting.





cad s/p remote pci, now with +trops/nstemi:


-pt has no cardiac sxs but trop mildly elevated, peaked at 1.8.  ECG 

unremarkable.  Possible demand ischemia from acute issues but given cad hx 

possibly nstemi as well.  Started hep gtt 7/11 but then had coffee ground fluids

in ngt so hep gtt stopped.   Cont statin, bb, ace. 


-check echo, cont tele


-will need ischemic eval, most likely with nuclear stress test, when acute 

issues resolved





htn:


-cont current meds





hld:


-cont statin





sbo:


-surgery following, attempting conservative management for now

## 2020-07-13 NOTE — PN
Physical Exam: 


SUBJECTIVE: 


Patient seen and examined at bedside.  The patient reports being tired.  Due to 

coffee ground emesis, the heparin is still being held.  Patient had a bowel 

movement yesterday.








OBJECTIVE:





                                   Vital Signs











 Period  Temp  Pulse  Resp  BP Sys/Vasquez  Pulse Ox


 


 Last 24 Hr  97.9 F-98.7 F  45-56  16-20  138-175/57-86  95-98











GENERAL: The patient is awake, alert, and fully oriented, in no acute distress.


HEAD: Normal with no signs of trauma.


EYES: Extraocular movements intact. No ptosis. 


ENT: Ears normal, nares patent, oropharynx clear without exudates, moist mucous 

membranes.


NECK: Trachea midline, full range of motion, supple. 


LUNGS: Breath sounds equal, clear to auscultation bilaterally, no wheezes, no 

crackles, no accessory muscle use. 


HEART: Regular rate and rhythm, S1, S2.


ABDOMEN: Soft, nontender, nondistended, normoactive bowel sounds, no guarding, 

no rebound, no masses.


EXTREMITIES: 2+ pulses, warm, well-perfused. 


NEUROLOGICAL: Normal speech, gait not observed.


PSYCH: Normal mood, normal affect.


SKIN: Warm, dry, normal turgor, no rashes or lesions noted














                         Laboratory Results - last 24 hr











  07/11/20 07/13/20 07/13/20





  10:20 05:51 05:51


 


WBC   7.1 


 


RBC   4.24 


 


Hgb   11.2 


 


Hct   35.1 


 


MCV   82.9 


 


MCH   26.5 


 


MCHC   32.0 


 


RDW   13.8 


 


Plt Count   202 


 


MPV   8.5 


 


PTT (Actin FS)    23.3 L


 


Sodium   


 


Potassium   


 


Chloride   


 


Carbon Dioxide   


 


Anion Gap   


 


BUN   


 


Creatinine   


 


Est GFR (CKD-EPI)AfAm   


 


Est GFR (CKD-EPI)NonAf   


 


POC Glucometer   


 


Random Glucose   


 


Calcium   


 


Phosphorus   


 


Magnesium   


 


Total Bilirubin   


 


AST   


 


ALT   


 


Alkaline Phosphatase   


 


Total Protein   


 


Albumin   


 


COVID-19 ([facility])  Not detected  














  07/13/20 07/13/20 07/13/20





  05:51 11:22 14:19


 


WBC   


 


RBC   


 


Hgb   


 


Hct   


 


MCV   


 


MCH   


 


MCHC   


 


RDW   


 


Plt Count   


 


MPV   


 


PTT (Actin FS)   


 


Sodium  140  


 


Potassium  3.5  


 


Chloride  105  


 


Carbon Dioxide  26  


 


Anion Gap  10  


 


BUN  11.7  


 


Creatinine  0.7  


 


Est GFR (CKD-EPI)AfAm  103.91  


 


Est GFR (CKD-EPI)NonAf  89.65  


 


POC Glucometer   77  85


 


Random Glucose  85  


 


Calcium  8.6  


 


Phosphorus  2.4 L  


 


Magnesium  1.5 L  


 


Total Bilirubin  0.6  


 


AST  12 L  


 


ALT  11 L  


 


Alkaline Phosphatase  32 L  


 


Total Protein  5.7 L  


 


Albumin  3.2 L  


 


COVID-19 ([facility])   














  07/13/20





  16:49


 


WBC 


 


RBC 


 


Hgb 


 


Hct 


 


MCV 


 


MCH 


 


MCHC 


 


RDW 


 


Plt Count 


 


MPV 


 


PTT (Actin FS) 


 


Sodium 


 


Potassium 


 


Chloride 


 


Carbon Dioxide 


 


Anion Gap 


 


BUN 


 


Creatinine 


 


Est GFR (CKD-EPI)AfAm 


 


Est GFR (CKD-EPI)NonAf 


 


POC Glucometer  84


 


Random Glucose 


 


Calcium 


 


Phosphorus 


 


Magnesium 


 


Total Bilirubin 


 


AST 


 


ALT 


 


Alkaline Phosphatase 


 


Total Protein 


 


Albumin 


 


COVID-19 ([facility]) 








Active Medications











Generic Name Dose Route Start Last Admin





  Trade Name Freq  PRN Reason Stop Dose Admin


 


Acetaminophen  650 mg  07/11/20 05:14  07/12/20 18:17





  Tylenol -  PO   650 mg





  Q4H PRN   Administration





  PAIN LEVEL 4 - 6  


 


Amlodipine Besylate  10 mg  07/11/20 10:00  07/13/20 10:36





  Norvasc -  PO   10 mg





  DAILY JULIA   Administration


 


Aspirin  81 mg  07/11/20 10:00  07/13/20 09:35





  Ecotrin -  PO   Not Given





  DAILY JULIA  


 


Atenolol  25 mg  07/11/20 10:00  07/13/20 09:35





  Tenormin -  PO   Not Given





  BID JULIA  


 


Atorvastatin Calcium  40 mg  07/11/20 22:00  07/12/20 22:41





  Lipitor -  PO   40 mg





  HS JULIA   Administration


 


Benzocaine/Menthol  1 each  07/13/20 16:21  07/13/20 16:46





  Cepacol Lozenge -  MM   1 each





  PRN PRN   Administration





  SORE THROAT  


 


Cholecalciferol  1,000 unit  07/11/20 10:00  07/13/20 10:36





  Vitamin D3 -  PO   1,000 unit





  DAILY JULIA   Administration


 


Cyanocobalamin  100 mcg  07/11/20 10:00  07/13/20 13:06





  Vitamin B12 -  PO   100 mcg





  DAILY JULIA   Administration


 


Dextrose/Sodium Chloride  1,000 mls @ 100 mls/hr  07/13/20 16:30  07/13/20 16:45





  D5-1/2ns -  IV   100 mls/hr





  ASDIR JULIA   Administration


 


Insulin Aspart  1 vial  07/11/20 07:00  07/13/20 16:52





  Novolog Vial Sliding Scale -  SQ   Not Given





  [facility]  





  Protocol  


 


Lisinopril  20 mg  07/11/20 10:00  07/13/20 10:36





  Prinivil  PO   20 mg





  DAILY JULIA   Administration


 


Pantoprazole Sodium  40 mg  07/12/20 22:00  07/13/20 10:37





  Protonix Iv  IVPUSH   40 mg





  BID JULIA   Administration


 


Tramadol HCl  50 mg  07/12/20 19:10  07/12/20 22:17





  Ultram -  PO   50 mg





  Q8H PRN   Administration





  PAIN LEVEL 7 - 10  











ASSESSMENT/PLAN:


65 year old female patent with past medical history that includes HTN, DM, CAD 

s/p stents, and multiple episodes of SBOs who presented to the ED with nausea, 

vomiting, and abdominal pain.  





1. SBO


- SBO found on CT A/P


- NG tube


- IV fluids


- per surgery, the patient is high risk for surgery at this time unless her 

situation becomes life threatening and needs emergency surgery.  Current plan, 

per surgery, is conservative management.


- Surgery following





2. Coffee ground emesis secondary to possible GI bleed


- Hgb 11.2


- Gastic Occult Blood Test positive


- Heparin being held


- The patient is taking a PPI





3. Tropenemia secondary to demand ischemia vs NSTEMI


- ED ECG did not show ST-elevations or ST-depression.


- Troponins did not sharply increase, but stayed between 1 and 2, which is a 

less typical pattern for an NSTEMI.





4. HTN


- The patient is taking amlodipine, lisinopril, atenolol





5. DM


- ISS





DVT PPx - SCDs.  Heparin held due to coffee ground emesis





GI PPx - Protonix





Dispo - Conservative management for the patient's SBO with careful monitoring in

case the patient requires more aggressive intervention











Visit type





- Emergency Visit


Emergency Visit: Yes


ED Registration Date: 07/11/20


Care time: The patient presented to the Emergency Department on the above date 

and was hospitalized for further evaluation of their emergent condition.





- New Patient


This patient is new to me today: No





- Critical Care


Critical Care patient: No





- Discharge Referral


Referred to [facility] No





ATTENDING PHYSICIAN STATEMENT





I saw and evaluated the patient.


I reviewed the resident's note and discussed the case with the resident.


I agree with the resident's findings and plan as documented.








SUBJECTIVE:








OBJECTIVE:








ASSESSMENT AND PLAN:

## 2020-07-13 NOTE — ECHO
Version:  1



Name:  SANG REN                               Exam: Adult Echocardiogram

MRN:  [id number]                                       Study Date:  2020, 2:04 PM

Age:  65 Years



 ____________________________________________________________________________________________________
__________



MMode/2D Measurements & Calculations





IVSd: 0.84 cm                                          LVIDs: 3.0 cm

LVIDd: 4.7 cm

LVPWd: 0.77 cm

LAV (MOD-bp):  71.0 ml

ACS: 1.53 cm                                           Ao root diam: 2.8 cm

LVOT diam: 1.95 cm                                     LA dimension: 3.3 cm



Doppler Measurements & Calculations



MV E max michael: 123.4 cm/sec                             Med E/e':  27.5

MV A max michael: 159.1 cm/sec                             Med Peak E' Michael:  4.5 cm/sec

MV E/A: 0.78

Lat E/e':  18.9

Lat Peak E' Mihcael:  6.5 cm/sec

MR max P.4 mmHg

Ao max P.4 mmHg                                   BRENDA(I,D): 2.46 cm

Ao mean P.4 mmHg                                   LV V1 mean: 98.8 cm/sec

Ao V2 max: 208.5 cm/sec                                LV V1 mean P.5 mmHg

AI P1/2t: 569.4 msec

                                                       TR max michael: 236.8 cm/sec

                                                       TR max P.6 mmHg



 Procedure

 TDS. Patient scanned supine.

 Left Ventricle

 The left ventricle is normal in size. There is mild concentric left ventricular hypertrophy. apical 
muscle

 strips. Left ventricular systolic function is normal. Ejection Fraction = 60-65%. The transmitral sp
ectral

 Doppler flow pattern is suggestive of impaired LV relaxation.

 Right Ventricle

 The right ventricle is normal in size and function.

 Atria

 The left atrium is mildly dilated. Right atrial size is normal.

 Mitral Valve

 There is mild mitral valve thickening. There is mild mitral regurgitation.

 Tricuspid Valve

 The tricuspid valve is not well visualized, but is grossly normal. There is Trace to mild tricuspid

 regurgitation. Right ventricular systolic pressure is normal.

 Aortic Valve

 The aortic valve is not well visualized. No hemodynamically significant valvular aortic stenosis. Mi
ld aortic

 regurgitation.

 Great Vessels

 The aortic root is normal size.

 Pericardium/Pleura

 There is no pericardial effusion.







 Summary Statements

 LV: Normal size,mild LVH, normal systolic function, EF 55-60%; apical muscle strippings, impaired re
laxation

 RV: Normal

 LA: Mildly dilated

 Mild AR,MR

 Trace to mild TR with normal RVSP





                  Bud Ware         2020, 5:38 PM

 Ordering Physician:  Rusty Tejeda

 Referring Physician:  RUSTY TEJEDA

 Performed By:  Sonam Steele

## 2020-07-13 NOTE — PN
Progress Note (short form)





- Note


Progress Note: 





GENERAL SURGERY





66 y/o female admitted with n/v and abdominal pain. + h/o SBO (1 managed 

operatively & one conservatively)


NGT inserted.


Passed flatus and had bm 7/12.


Voiding spontaneosuly on a bedpan.





AXR 7/13: high grade psbo. air & stool in colon. no pneumotosis





AVSS. Afebrile.





Gen: nad


ENT: NGT on lwcs


ABD: softly distended. Not ttp. + bowel sounds (hypoactive). No 

guarding/rigidity


LE: all compartments soft.





<Dominic Pinto P - Last Filed: 07/13/20 13:46>





- Note


Progress Note: 





Attending Surgeon:


I personally saw and examined the patient. My examination reveals a patient with

ongoing SBO. I discussed the case with the surgical PA and agree with their 

findings and plan of care with any exceptions as noted. ~ Pedro Luis Avalos MD, 

FACS 





<Pedro Luis Avalos N - Last Filed: 07/17/20 10:39>





Problem List





- Problems


(1) Small bowel obstruction


Assessment/Plan: 





NPO


IVF


GI PPx


DVT PPx


Serial abd exams


Replete elytes PRN


MOnitor/record ouptut q shift


Pain management (avoid narcotics)


Aggressive mobilization


Cont care per primary team


Surgery Team to cont following


Above plan discussed with Dr. Avalos and agrees


Code(s): K56.609 - UNSP INTESTNL OBST, UNSP AS TO PARTIAL VERSUS COMPLETE OBST  







<Dominic Pinto P - Last Filed: 07/13/20 13:46>

## 2020-07-14 LAB
ALBUMIN SERPL-MCNC: 3.3 G/DL (ref 3.4–5)
ALP SERPL-CCNC: 33 U/L (ref 45–117)
ALT SERPL-CCNC: 13 U/L (ref 13–61)
ANION GAP SERPL CALC-SCNC: 6 MMOL/L (ref 8–16)
AST SERPL-CCNC: 13 U/L (ref 15–37)
BILIRUB SERPL-MCNC: 0.6 MG/DL (ref 0.2–1)
BUN SERPL-MCNC: 8.8 MG/DL (ref 7–18)
CALCIUM SERPL-MCNC: 9 MG/DL (ref 8.5–10.1)
CHLORIDE SERPL-SCNC: 103 MMOL/L (ref 98–107)
CO2 SERPL-SCNC: 30 MMOL/L (ref 21–32)
CREAT SERPL-MCNC: 0.8 MG/DL (ref 0.55–1.3)
DEPRECATED RDW RBC AUTO: 13.8 % (ref 11.6–15.6)
GLUCOSE SERPL-MCNC: 182 MG/DL (ref 74–106)
HCT VFR BLD CALC: 36 % (ref 32.4–45.2)
HGB BLD-MCNC: 11.7 GM/DL (ref 10.7–15.3)
MAGNESIUM SERPL-MCNC: 1.6 MG/DL (ref 1.8–2.4)
MAGNESIUM SERPL-MCNC: 1.9 MG/DL (ref 1.8–2.4)
MCH RBC QN AUTO: 26.7 PG (ref 25.7–33.7)
MCHC RBC AUTO-ENTMCNC: 32.4 G/DL (ref 32–36)
MCV RBC: 82.4 FL (ref 80–96)
PHOSPHATE SERPL-MCNC: 2.3 MG/DL (ref 2.5–4.9)
PHOSPHATE SERPL-MCNC: 2.4 MG/DL (ref 2.5–4.9)
PLATELET # BLD AUTO: 209 K/MM3 (ref 134–434)
PMV BLD: 8.3 FL (ref 7.5–11.1)
POTASSIUM SERPLBLD-SCNC: 3.7 MMOL/L (ref 3.5–5.1)
PROT SERPL-MCNC: 6.2 G/DL (ref 6.4–8.2)
RBC # BLD AUTO: 4.38 M/MM3 (ref 3.6–5.2)
SODIUM SERPL-SCNC: 139 MMOL/L (ref 136–145)
WBC # BLD AUTO: 7.8 K/MM3 (ref 4–10)

## 2020-07-14 RX ADMIN — LISINOPRIL SCH: 20 TABLET ORAL at 10:50

## 2020-07-14 RX ADMIN — VITAM B12 SCH MCG: 100 TAB at 11:32

## 2020-07-14 RX ADMIN — ATENOLOL SCH: 25 TABLET ORAL at 10:00

## 2020-07-14 RX ADMIN — PANTOPRAZOLE SODIUM SCH MG: 40 INJECTION, POWDER, FOR SOLUTION INTRAVENOUS at 11:32

## 2020-07-14 RX ADMIN — LISINOPRIL SCH MG: 20 TABLET ORAL at 06:05

## 2020-07-14 RX ADMIN — INSULIN ASPART SCH: 100 INJECTION, SOLUTION INTRAVENOUS; SUBCUTANEOUS at 21:54

## 2020-07-14 RX ADMIN — INSULIN ASPART SCH: 100 INJECTION, SOLUTION INTRAVENOUS; SUBCUTANEOUS at 17:34

## 2020-07-14 RX ADMIN — AMLODIPINE BESYLATE SCH MG: 10 TABLET ORAL at 11:32

## 2020-07-14 RX ADMIN — ATORVASTATIN CALCIUM SCH MG: 40 TABLET, FILM COATED ORAL at 21:38

## 2020-07-14 RX ADMIN — SODIUM CHLORIDE SCH MLS/HR: 9 INJECTION, SOLUTION INTRAVENOUS at 11:31

## 2020-07-14 RX ADMIN — ACETAMINOPHEN PRN MG: 325 TABLET ORAL at 19:02

## 2020-07-14 RX ADMIN — VITAMIN D, TAB 1000IU (100/BT) SCH UNIT: 25 TAB at 11:36

## 2020-07-14 RX ADMIN — INSULIN ASPART SCH: 100 INJECTION, SOLUTION INTRAVENOUS; SUBCUTANEOUS at 11:54

## 2020-07-14 RX ADMIN — PANTOPRAZOLE SODIUM SCH MG: 40 INJECTION, POWDER, FOR SOLUTION INTRAVENOUS at 21:38

## 2020-07-14 RX ADMIN — INSULIN ASPART SCH UNITS: 100 INJECTION, SOLUTION INTRAVENOUS; SUBCUTANEOUS at 06:07

## 2020-07-14 RX ADMIN — ASPIRIN SCH: 81 TABLET, COATED ORAL at 09:50

## 2020-07-14 NOTE — PN
Progress Note (short form)





- Note


Progress Note: 





s: no cp sob palps dizzy





                                        


                               Current Medications











Generic Name Dose Route Start Last Admin





  Trade Name Freq  PRN Reason Stop Dose Admin


 


Acetaminophen  650 mg  07/11/20 05:14  07/12/20 18:17





  Tylenol -  PO   650 mg





  Q4H PRN   Administration





  PAIN LEVEL 4 - 6  


 


Amlodipine Besylate  10 mg  07/11/20 10:00  07/14/20 11:32





  Norvasc -  PO   10 mg





  DAILY JULIA   Administration


 


Aspirin  81 mg  07/11/20 10:00  07/14/20 09:50





  Ecotrin -  PO   Not Given





  DAILY JULIA  


 


Atorvastatin Calcium  40 mg  07/11/20 22:00  07/13/20 21:34





  Lipitor -  PO   40 mg





  HS JULIA   Administration


 


Benzocaine/Menthol  1 each  07/13/20 16:21  07/13/20 16:46





  Cepacol Lozenge -  MM   1 each





  PRN PRN   Administration





  SORE THROAT  


 


Cholecalciferol  1,000 unit  07/11/20 10:00  07/14/20 11:36





  Vitamin D3 -  PO   1,000 unit





  DAILY JULIA   Administration


 


Cyanocobalamin  100 mcg  07/11/20 10:00  07/14/20 11:32





  Vitamin B12 -  PO   100 mcg





  DAILY JULIA   Administration


 


Sodium Chloride  1,000 mls @ 100 mls/hr  07/14/20 08:00  07/14/20 11:31





  Normal Saline -  IV   100 mls/hr





  ASDIR JULIA   Administration


 


Insulin Aspart  1 vial  07/11/20 07:00  07/14/20 11:54





  Novolog Vial Sliding Scale -  SQ   Not Given





  [facility]  





  Protocol  


 


Lisinopril  20 mg  07/11/20 10:00  07/14/20 10:50





  Prinivil  PO   Not Given





  DAILY JULIA  


 


Pantoprazole Sodium  40 mg  07/12/20 22:00  07/14/20 11:32





  Protonix Iv  IVPUSH   40 mg





  BID JULIA   Administration


 


Tramadol HCl  50 mg  07/12/20 19:10  07/12/20 22:17





  Ultram -  PO   50 mg





  Q8H PRN   Administration





  PAIN LEVEL 7 - 10  














                                   Vital Signs











 Period  Temp  Pulse  Resp  BP Sys/Vasquez  Pulse Ox


 


 Last 24 Hr  98.2 F-99.1 F  46-57  16-20  145-181/57-77  98-99














nad no jvd


rrr s1s2 no mrg


cta bl nl eff


aao3


no le e/c/c


abd nd nt


no jaundice diaphoresis





                                        


tele: sinus naty





ecg: wnl





cxr: clear 





echo 7/2020 mild LVH, nl LV function, E/A reversal, mild AR/MR








a/p:  65 f hx htn, dm, cad s/p pci 2008 (after +stress test, no MI), exlap for 

prior sbo, here with abd pain, vomiting.





cad s/p remote pci, now with +trops/nstemi:


-pt has no cardiac sxs but trop mildly elevated, peaked at 1.8.  ECG 

unremarkable.  Possible demand ischemia from acute issues but given cad hx 

possibly nstemi as well.  Started hep gtt 7/11 but then had coffee ground fluids

in ngt so hep gtt stopped.   Cont statin, bb, ace. 


-cont tele


-will need ischemic eval, most likely with nuclear stress test, when acute 

issues resolved





htn:


-not well controlled


- dc atenolol for bradycardia in the 40s, has not been receiving


- received PO meds per NGT this morning, if not improving may need PRN IV meds 

given she has NGT to LWS and may not have adequate absorption of PO meds





hld:


-cont statin





sbo:


-surgery following, attempting conservative management for now

## 2020-07-14 NOTE — PN
Teaching Attending Note


Name of Resident: Pranav Jones





ATTENDING PHYSICIAN STATEMENT





I saw and evaluated the patient.


I reviewed the resident's note and discussed the case with the resident.


I agree with the resident's findings and plan as documented.








SUBJECTIVE: Ongoing abdominal discomfort. NG tube draining bilious material. No 

fever/chills.








OBJECTIVE: Afebrile, Hemodnamically Stable.





                                Last Vital Signs











Temp Pulse Resp BP Pulse Ox


 


 99.1 F   49 L  16   194/77 H  99 


 


 07/14/20 13:05  07/14/20 13:08  07/14/20 13:07  07/14/20 13:07  07/14/20 09:05








HEENT - Atraumatic, Normocephalic. NGT in situ.


Heart - S1, S2, RRR


Lungs - clear to auscultation


Abdomen - mild distension. Mild generalized tenderness. Bowel Sounds ++


Extremities - no edema, no calf tenderness.


Neuro - AAO x 3. Tone/Power normal all extremities.





                         Laboratory Results - last 24 hr











  07/13/20 07/13/20 07/14/20





  14:19 16:49 05:52


 


WBC    7.8


 


RBC    4.38


 


Hgb    11.7


 


Hct    36.0


 


MCV    82.4


 


MCH    26.7


 


MCHC    32.4


 


RDW    13.8


 


Plt Count    209


 


MPV    8.3


 


PTT (Actin FS)   


 


Sodium   


 


Potassium   


 


Chloride   


 


Carbon Dioxide   


 


Anion Gap   


 


BUN   


 


Creatinine   


 


Est GFR (CKD-EPI)AfAm   


 


Est GFR (CKD-EPI)NonAf   


 


POC Glucometer  85  84 


 


Random Glucose   


 


Calcium   


 


Phosphorus   


 


Magnesium   


 


Total Bilirubin   


 


AST   


 


ALT   


 


Alkaline Phosphatase   


 


Total Protein   


 


Albumin   














  07/14/20 07/14/20 07/14/20





  05:52 05:52 11:51


 


WBC   


 


RBC   


 


Hgb   


 


Hct   


 


MCV   


 


MCH   


 


MCHC   


 


RDW   


 


Plt Count   


 


MPV   


 


PTT (Actin FS)  29.8  


 


Sodium   139 


 


Potassium   3.7 


 


Chloride   103 


 


Carbon Dioxide   30 


 


Anion Gap   6 L 


 


BUN   8.8 


 


Creatinine   0.8 


 


Est GFR (CKD-EPI)AfAm   88.42 


 


Est GFR (CKD-EPI)NonAf   76.29 


 


POC Glucometer    142


 


Random Glucose   182 H 


 


Calcium   9.0 


 


Phosphorus   2.4 L 


 


Magnesium   1.6 L 


 


Total Bilirubin   0.6 


 


AST   13 L 


 


ALT   13 


 


Alkaline Phosphatase   33 L 


 


Total Protein   6.2 L 


 


Albumin   3.3 L 








                               Current Medications











Generic Name Dose Route Start Last Admin





  Trade Name Freq  PRN Reason Stop Dose Admin


 


Acetaminophen  650 mg  07/11/20 05:14  07/12/20 18:17





  Tylenol -  PO   650 mg





  Q4H PRN   Administration





  PAIN LEVEL 4 - 6  


 


Amlodipine Besylate  10 mg  07/11/20 10:00  07/14/20 11:32





  Norvasc -  PO   10 mg





  DAILY JULIA   Administration


 


Aspirin  81 mg  07/11/20 10:00  07/14/20 09:50





  Ecotrin -  PO   Not Given





  DAILY JULIA  


 


Atorvastatin Calcium  40 mg  07/11/20 22:00  07/13/20 21:34





  Lipitor -  PO   40 mg





  HS JULIA   Administration


 


Benzocaine/Menthol  1 each  07/13/20 16:21  07/13/20 16:46





  Cepacol Lozenge -  MM   1 each





  PRN PRN   Administration





  SORE THROAT  


 


Cholecalciferol  1,000 unit  07/11/20 10:00  07/14/20 11:36





  Vitamin D3 -  PO   1,000 unit





  DAILY JULIA   Administration


 


Cyanocobalamin  100 mcg  07/11/20 10:00  07/14/20 11:32





  Vitamin B12 -  PO   100 mcg





  DAILY JULIA   Administration


 


Sodium Chloride  1,000 mls @ 100 mls/hr  07/14/20 08:00  07/14/20 11:31





  Normal Saline -  IV   100 mls/hr





  ASDIR JULIA   Administration


 


Potassium Phosphate 15 mm/  255 mls @ 62.5 mls/hr  07/14/20 13:50 





  Sodium Chloride  IVPB  07/14/20 17:54 





  ONCE ONE  


 


Insulin Aspart  1 vial  07/11/20 07:00  07/14/20 11:54





  Novolog Vial Sliding Scale -  SQ   Not Given





  [facility]  





  Protocol  


 


Lisinopril  20 mg  07/11/20 10:00  07/14/20 10:50





  Prinivil  PO   Not Given





  [facility]  


 


Pantoprazole Sodium  40 mg  07/12/20 22:00  07/14/20 11:32





  Protonix Iv  IVPUSH   40 mg





  BID JULIA   Administration


 


Tramadol HCl  50 mg  07/12/20 19:10  07/12/20 22:17





  Ultram -  PO   50 mg





  Q8H PRN   Administration





  PAIN LEVEL 7 - 10  








                                Home Medications











 Medication  Instructions  Recorded


 


Aspirin [Aspirin EC] 325 mg PO DAILY 10/22/15


 


Atenolol [Tenormin] 25 mg PO BID 10/22/15


 


Lisinopril [Prinivil -] 20 mg PO DAILY 10/22/15


 


Metformin HCl [Glucophage] 500 mg PO BID 10/22/15


 


Amlodipine Besylate [Norvasc -] 10 mg PO DAILY 01/30/20


 


Atorvastatin Calcium [Lipitor] 40 mg PO HS 01/30/20


 


Ergocalciferol (Vitamin D2) 1,000 unit PO DAILY 01/30/20





[Vitamin D2]  


 


Vitamin B12 100 mg PO DAILY 01/30/20


 


Glimepiride 1 mg PO DAILY 07/11/20


 


Naproxen 500 mg PO Q12H PRN 07/11/20














ASSESSMENT AND PLAN:





65 year old female with history of HTN, DM 2, CAD s/p Stents 2008, history of 

multiple episodes of SBO, presents with SBO and found to have NSTEMI. 





1. Small Bowel Obstruction, recurrent


AXR - pSBO


NPO/IV fluids/NGT decompression


Surgery following.





2. Acute NSTEMI


Likely secondary to demand, TropI max 1.84


Seen by Cardio and started on Heparin drip, eventually stopped due to coffee 

ground blood loss in NGT.


Cardiology following.


On Aspirin/Lipitor/ACE-I. Atenolol stopped due to Bradycardia.





3. Acute Blood Loss anemia sec to coffee ground emesis


Gastric Occult Blood +


Heparin drip stopped.


Given SBO and acute MI, unlikely candidate for EGD.


Monitor H/H


Protonix BID.


Eventual GI work-up.





4. Proteinuria and Hematuria


Renal Ultrasound - bilateral renal cortical scarring.


Urology out-patient follow-up





5. HTN - Uncontrolled. Continue Norvasc Lisinopril. Atenolol stopped due to 

Bradycardia. Hydralazine IV PRN while NPO.





6. Hypomagnesemia/Hypophosphatemia - repleted.











DVT Px - SCDs. Heparin drip held.

## 2020-07-14 NOTE — PN
Progress Note (short form)





- Note


Progress Note: 


S: Pt reports abdominal pain improved, Pt passing flatus, ambulating, No 

complaints this AM. Denies hematemesis 








                                Selected Entries











  07/13/20 07/13/20 07/13/20





  07:59 10:50 17:05


 


Output, Gastric 250 100 100





Drainage   





Amount   





Vitals reviewed: afebrile 


NAD


unlabored breathing on RA 


NGT in place 


abd: soft, ND, NT tympanic diffusely, surgical scars noted and well healed 


Ext: nonedematous 














                                    CBC, BMP





                                 07/14/20 05:52 





                                 07/14/20 05:52 


                               Current Medications











Generic Name Dose Route Start Last Admin





  Trade Name Freq  PRN Reason Stop Dose Admin


 


Acetaminophen  650 mg  07/11/20 05:14  07/12/20 18:17





  Tylenol -  PO   650 mg





  Q4H PRN   Administration





  PAIN LEVEL 4 - 6  


 


Amlodipine Besylate  10 mg  07/11/20 10:00  07/13/20 10:36





  Norvasc -  PO   10 mg





  DAILY JULIA   Administration


 


Aspirin  81 mg  07/11/20 10:00  07/13/20 09:35





  Ecotrin -  PO   Not Given





  DAILY JULIA  


 


Atenolol  25 mg  07/11/20 10:00  07/13/20 21:34





  Tenormin -  PO   25 mg





  BID JULIA   Administration


 


Atorvastatin Calcium  40 mg  07/11/20 22:00  07/13/20 21:34





  Lipitor -  PO   40 mg





  HS JULIA   Administration


 


Benzocaine/Menthol  1 each  07/13/20 16:21  07/13/20 16:46





  Cepacol Lozenge -  MM   1 each





  PRN PRN   Administration





  SORE THROAT  


 


Cholecalciferol  1,000 unit  07/11/20 10:00  07/13/20 10:36





  Vitamin D3 -  PO   1,000 unit





  DAILY JULIA   Administration


 


Cyanocobalamin  100 mcg  07/11/20 10:00  07/13/20 13:06





  Vitamin B12 -  PO   100 mcg





  DAILY JULIA   Administration


 


Sodium Chloride  1,000 mls @ 100 mls/hr  07/14/20 08:00 





  Normal Saline -  IV  





  ASDIR JULIA  


 


Insulin Aspart  1 vial  07/11/20 07:00  07/14/20 06:07





  Novolog Vial Sliding Scale -  SQ   2 units





  [facility] JULIA   Administration





  Protocol  


 


Lisinopril  20 mg  07/11/20 10:00  07/14/20 06:05





  Prinivil  PO   20 mg





  DAILY JULIA   Administration


 


Pantoprazole Sodium  40 mg  07/12/20 22:00  07/13/20 21:34





  Protonix Iv  IVPUSH   40 mg





  BID JULIA   Administration


 


Tramadol HCl  50 mg  07/12/20 19:10  07/12/20 22:17





  Ultram -  PO   50 mg





  Q8H PRN   Administration





  PAIN LEVEL 7 - 10  











A&P: 64yo F with hx of multiple SBOs requiring ex lap, hysterectomy, 

appendectomy presents with N/V and abd pain. Found to have a distal SBO with 

transition point in RLQ. Now passing flatus with BM, found to have elevated 

troponins 





#Distal SBO 2/2 adhesions 


-AXR pending 07/14


-Maintain NGT to LWS 


-Monitor I&Os 


-NPO/IVFs 


-PPI 


-serial abd exams 


-replete electrolytes PRN


-Labs: WBC 7.8,  K+3.7, Mg 1.6, Phos 2.4, H&H stable 


elevated troponin (possible NSTEMI)-hep gtt d/c'ed due to GIB


-DVT ppx: OOB, SCDs, SQH (HELD for hematemesis) 


-pain control PRN 


-appreciate hospitalist comanagement 


-if suspect GI bleed, please consult GI


-will continue to follow 








d/w Dr. Avalos 








<Dorys Minor - Last Filed: 07/14/20 08:47>





- Note


Progress Note: 





Attending Surgeon:


I personally saw and examined the patient. My examination reveals a patient with

ongoing SBO. I discussed the case with the surgical PA and agree with their 

findings and plan of care with any exceptions as noted. ~ Pedro Luis Avalos MD, 

FACS 





<Pedro Luis Avalos - Last Filed: 07/17/20 10:38>

## 2020-07-14 NOTE — PN
Physical Exam: 


SUBJECTIVE: 


Patient seen and examined at bedside.  The patient reports being able to pass 

gas, but not being able to have a bowel movement.  The patent denies 

fever/chills, abdominal pain, and nausea/vomiting.  The patient is currently NPO

and with a NG tube; and, the nurses are giving her medications through her NG 

tube and allowing the medications to be digested by turning off the NG tube for 

an hour or two after giving the medications.








OBJECTIVE:





                                   Vital Signs











 Period  Temp  Pulse  Resp  BP Sys/Vasquez  Pulse Ox


 


 Last 24 Hr  98.2 F-99.1 F  46-60  16-18  145-194/57-79  99-99











GENERAL: The patient is awake, alert, and fully oriented, in no acute distress.


HEAD: Normal with no signs of trauma.


EYES: Extraocular movements intact. No ptosis. 


ENT: Ears normal, nares patent, oropharynx clear without exudates, moist mucous 

membranes.


NECK: Trachea midline, full range of motion, supple. 


LUNGS: Breath sounds equal, clear to auscultation bilaterally, no wheezes, no 

crackles, no accessory muscle use. 


HEART: Regular rate and rhythm, S1, S2 without murmur, rub or gallop.


ABDOMEN: Soft, nontender, nondistended, normoactive to hyperactive bowel sounds,

no guarding, no rebound, no masses.


EXTREMITIES: 2+ pulses, warm, well-perfused, no edema. 


NEUROLOGICAL: Normal speech, gait not observed.


PSYCH: Normal mood, normal affect.


SKIN: Warm, dry, normal turgor, no rashes or lesions noted














                         Laboratory Results - last 24 hr











  07/14/20 07/14/20 07/14/20





  05:52 05:52 05:52


 


WBC  7.8  


 


RBC  4.38  


 


Hgb  11.7  


 


Hct  36.0  


 


MCV  82.4  


 


MCH  26.7  


 


MCHC  32.4  


 


RDW  13.8  


 


Plt Count  209  


 


MPV  8.3  


 


PTT (Actin FS)   29.8 


 


Sodium    139


 


Potassium    3.7


 


Chloride    103


 


Carbon Dioxide    30


 


Anion Gap    6 L


 


BUN    8.8


 


Creatinine    0.8


 


Est GFR (CKD-EPI)AfAm    88.42


 


Est GFR (CKD-EPI)NonAf    76.29


 


POC Glucometer   


 


Random Glucose    182 H


 


Calcium    9.0


 


Phosphorus    2.4 L


 


Magnesium    1.6 L


 


Total Bilirubin    0.6


 


AST    13 L


 


ALT    13


 


Alkaline Phosphatase    33 L


 


Total Protein    6.2 L


 


Albumin    3.3 L














  07/14/20 07/14/20 07/14/20





  11:51 15:05 17:08


 


WBC   


 


RBC   


 


Hgb   


 


Hct   


 


MCV   


 


MCH   


 


MCHC   


 


RDW   


 


Plt Count   


 


MPV   


 


PTT (Actin FS)   


 


Sodium   


 


Potassium   


 


Chloride   


 


Carbon Dioxide   


 


Anion Gap   


 


BUN   


 


Creatinine   


 


Est GFR (CKD-EPI)AfAm   


 


Est GFR (CKD-EPI)NonAf   


 


POC Glucometer  142   114


 


Random Glucose   


 


Calcium   


 


Phosphorus   2.3 L 


 


Magnesium   1.9 


 


Total Bilirubin   


 


AST   


 


ALT   


 


Alkaline Phosphatase   


 


Total Protein   


 


Albumin   








Active Medications











Generic Name Dose Route Start Last Admin





  Trade Name Freq  PRN Reason Stop Dose Admin


 


Acetaminophen  650 mg  07/11/20 05:14  07/12/20 18:17





  Tylenol -  PO   650 mg





  Q4H PRN   Administration





  PAIN LEVEL 4 - 6  


 


Amlodipine Besylate  10 mg  07/11/20 10:00  07/14/20 11:32





  Norvasc -  PO   10 mg





  DAILY JULIA   Administration


 


Aspirin  81 mg  07/11/20 10:00  07/14/20 09:50





  Ecotrin -  PO   Not Given





  DAILY [facility]  


 


Atorvastatin Calcium  40 mg  07/11/20 22:00  07/13/20 21:34





  Lipitor -  PO   40 mg





  HS JULIA   Administration


 


Benzocaine/Menthol  1 each  07/13/20 16:21  07/13/20 16:46





  Cepacol Lozenge -  MM   1 each





  PRN PRN   Administration





  SORE THROAT  


 


Cholecalciferol  1,000 unit  07/11/20 10:00  07/14/20 11:36





  Vitamin D3 -  PO   1,000 unit





  DAILY JULIA   Administration


 


Cyanocobalamin  100 mcg  07/11/20 10:00  07/14/20 11:32





  Vitamin B12 -  PO   100 mcg





  DAILY JULIA   Administration


 


Hydralazine HCl  10 mg  07/14/20 15:00  07/14/20 15:33





  Apresoline Injection -  IVPB   10 mg





  Q8H PRN   Administration





  HYPERTENSION  


 


Sodium Chloride  1,000 mls @ 100 mls/hr  07/14/20 08:00  07/14/20 11:31





  Normal Saline -  IV   100 mls/hr





  ASDIR JULIA   Administration


 


Insulin Aspart  1 vial  07/11/20 07:00  07/14/20 17:34





  Novolog Vial Sliding Scale -  SQ   Not Given





  [facility]  





  Protocol  


 


Lisinopril  20 mg  07/11/20 10:00  07/14/20 10:50





  Prinivil  PO   Not Given





  DAILY JULIA  


 


Pantoprazole Sodium  40 mg  07/12/20 22:00  07/14/20 11:32





  Protonix Iv  IVPUSH   40 mg





  BID JULIA   Administration


 


Tramadol HCl  50 mg  07/12/20 19:10  07/12/20 22:17





  Ultram -  PO   50 mg





  Q8H PRN   Administration





  PAIN LEVEL 7 - 10  











ASSESSMENT/PLAN:


65 year old female patent with past medical history that includes HTN, DM, CAD 

s/p stents, and multiple episodes of SBOs who presented to the ED with nausea, 

vomiting, and abdominal pain.





1. SBO


- follow up Abdominal X-ray showed a high grade partial SBO.


- NG tube


- IV fluids


- Current plan, per surgery, is conservative management.





2. Tropenemia secondary to demand ischemia vs NSTEMI


- Trop max 1.84


- The patient is taking aspirin, lipitor, and ace-i.  The patient is off 

Atenolol due to low heart rate.





3. Coffee ground emesis secondary to possible GI bleed


- Hgb 11.7 today


- monitoring Hgb


- Heparin being held


- The patient is taking a PPI





4. Proteinuria and Hematuria


- Urology outpatient f/u


- Renal ultrasound shows bilateral renal cortical scarring





5. HTN


- The patient is taking amlodipine and lisinopril and hydralazine


- Blood pressure has been rising, but fell after taking hydralazine to 172/79


- Monitoring blood pressure





6. DM


- ISS





DVT PPx - SCDs.  Heparin held due to coffee ground emesis





GI PPx - Protonix





Dispo - Possible GI consult.  Conservative management per surgery.











Visit type





- Emergency Visit


Emergency Visit: Yes


ED Registration Date: 07/11/20


Care time: The patient presented to the Emergency Department on the above date 

and was hospitalized for further evaluation of their emergent condition.





- New Patient


This patient is new to me today: No





- Critical Care


Critical Care patient: No





- Discharge Referral


Referred to [facility] No





ATTENDING PHYSICIAN STATEMENT





I saw and evaluated the patient.


I reviewed the resident's note and discussed the case with the resident.


I agree with the resident's findings and plan as documented.








SUBJECTIVE:








OBJECTIVE:








ASSESSMENT AND PLAN:

## 2020-07-15 LAB
ANION GAP SERPL CALC-SCNC: 10 MMOL/L (ref 8–16)
BUN SERPL-MCNC: 7.8 MG/DL (ref 7–18)
CALCIUM SERPL-MCNC: 9.1 MG/DL (ref 8.5–10.1)
CHLORIDE SERPL-SCNC: 103 MMOL/L (ref 98–107)
CO2 SERPL-SCNC: 26 MMOL/L (ref 21–32)
CREAT SERPL-MCNC: 0.6 MG/DL (ref 0.55–1.3)
DEPRECATED RDW RBC AUTO: 14 % (ref 11.6–15.6)
GLUCOSE SERPL-MCNC: 133 MG/DL (ref 74–106)
HCT VFR BLD CALC: 37.5 % (ref 32.4–45.2)
HGB BLD-MCNC: 12 GM/DL (ref 10.7–15.3)
MAGNESIUM SERPL-MCNC: 1.5 MG/DL (ref 1.8–2.4)
MAGNESIUM SERPL-MCNC: 1.7 MG/DL (ref 1.8–2.4)
MCH RBC QN AUTO: 26.9 PG (ref 25.7–33.7)
MCHC RBC AUTO-ENTMCNC: 32 G/DL (ref 32–36)
MCV RBC: 84 FL (ref 80–96)
PHOSPHATE SERPL-MCNC: 3 MG/DL (ref 2.5–4.9)
PLATELET # BLD AUTO: 194 K/MM3 (ref 134–434)
PMV BLD: 8.7 FL (ref 7.5–11.1)
POTASSIUM SERPLBLD-SCNC: 3.2 MMOL/L (ref 3.5–5.1)
POTASSIUM SERPLBLD-SCNC: 3.2 MMOL/L (ref 3.5–5.1)
RBC # BLD AUTO: 4.46 M/MM3 (ref 3.6–5.2)
SODIUM SERPL-SCNC: 139 MMOL/L (ref 136–145)
WBC # BLD AUTO: 9.3 K/MM3 (ref 4–10)

## 2020-07-15 RX ADMIN — VITAMIN D, TAB 1000IU (100/BT) SCH UNIT: 25 TAB at 09:42

## 2020-07-15 RX ADMIN — SODIUM CHLORIDE SCH MLS/HR: 9 INJECTION, SOLUTION INTRAVENOUS at 06:01

## 2020-07-15 RX ADMIN — PANTOPRAZOLE SODIUM SCH MG: 40 INJECTION, POWDER, FOR SOLUTION INTRAVENOUS at 09:35

## 2020-07-15 RX ADMIN — AMLODIPINE BESYLATE SCH MG: 10 TABLET ORAL at 09:36

## 2020-07-15 RX ADMIN — POTASSIUM CHLORIDE SCH: 7.46 INJECTION, SOLUTION INTRAVENOUS at 16:17

## 2020-07-15 RX ADMIN — VITAM B12 SCH MCG: 100 TAB at 09:41

## 2020-07-15 RX ADMIN — INSULIN ASPART SCH: 100 INJECTION, SOLUTION INTRAVENOUS; SUBCUTANEOUS at 17:11

## 2020-07-15 RX ADMIN — LISINOPRIL SCH MG: 20 TABLET ORAL at 09:36

## 2020-07-15 RX ADMIN — POTASSIUM CHLORIDE SCH MLS/HR: 7.46 INJECTION, SOLUTION INTRAVENOUS at 17:08

## 2020-07-15 RX ADMIN — INSULIN ASPART SCH: 100 INJECTION, SOLUTION INTRAVENOUS; SUBCUTANEOUS at 12:05

## 2020-07-15 RX ADMIN — ATORVASTATIN CALCIUM SCH MG: 40 TABLET, FILM COATED ORAL at 21:36

## 2020-07-15 RX ADMIN — INSULIN ASPART SCH: 100 INJECTION, SOLUTION INTRAVENOUS; SUBCUTANEOUS at 06:07

## 2020-07-15 RX ADMIN — INSULIN ASPART SCH: 100 INJECTION, SOLUTION INTRAVENOUS; SUBCUTANEOUS at 21:48

## 2020-07-15 RX ADMIN — POTASSIUM CHLORIDE SCH MLS/HR: 7.46 INJECTION, SOLUTION INTRAVENOUS at 09:34

## 2020-07-15 RX ADMIN — POTASSIUM CHLORIDE SCH MLS/HR: 7.46 INJECTION, SOLUTION INTRAVENOUS at 14:50

## 2020-07-15 RX ADMIN — SODIUM CHLORIDE SCH: 9 INJECTION, SOLUTION INTRAVENOUS at 11:27

## 2020-07-15 RX ADMIN — POTASSIUM CHLORIDE SCH MLS/HR: 7.46 INJECTION, SOLUTION INTRAVENOUS at 21:37

## 2020-07-15 RX ADMIN — PANTOPRAZOLE SODIUM SCH MG: 40 INJECTION, POWDER, FOR SOLUTION INTRAVENOUS at 21:36

## 2020-07-15 RX ADMIN — POTASSIUM CHLORIDE SCH: 7.46 INJECTION, SOLUTION INTRAVENOUS at 17:11

## 2020-07-15 RX ADMIN — SODIUM CHLORIDE SCH MLS/HR: 9 INJECTION, SOLUTION INTRAVENOUS at 22:30

## 2020-07-15 NOTE — PN
Progress Note (short form)





- Note


Progress Note: 














Pt seen and examined. Doing "okay". NPO with NGT. OOb without issue. Denies 

cp/sob, n/v/d. Passing flatus. NO BM














                                   Vital Signs











Temp  99.3 F   07/15/20 09:29


 


Pulse  57 L  07/15/20 09:29


 


Resp  18   07/15/20 09:29


 


BP  170/68   07/15/20 09:29


 


Pulse Ox  97   07/15/20 09:29








                                 Intake & Output











 07/14/20 07/14/20 07/15/20





 11:59 23:59 11:59


 


Intake Total  510 700


 


Output Total  50 50


 


Balance  460 650


 


Intake:   


 


  IV  410 700


 


    Normal Saline - 1,000 ml  400 700





    @ 100 mls/hr IV ASDIR JULIA   





    Rx#:[id number]   


 


    saline lock  10 


 


  IVPB  100 


 


  Oral  0 


 


Output:   


 


  Gastric Drainage  50 50


 


Other:   


 


  Voiding Method Toilet Toilet Toilet


 


  # Unmeasured Voids   


 


    Void  2 2


 


  Bowel Movement  No 








                                    CBC, BMP





                                 07/15/20 05:50 





                                 07/15/20 05:50 














Gen: awake, alert, nad


Resp: unlabored on ra


Abdo: ngt in place with approx 150ml since yesterday bilious. Abdo minimally ttp

diffusely, mildly distended. No rebound or guarding. Hypoactive bowel sounds














A/P:   


66 y/o F w/ PMHx HTN, DM, herniated disc, sciatica, restless leg syndrome, h

erniated disk, exploratory laparatomy, Hx of SBO, who prersented to the ED with 

nausea, vomiting, and abdominal pain.  





-axr pending for this am








NPO


IVF


GI PPx


DVT PPx


Serial abd exams


Replete lytes PRN


MOnitor/record output q shift


Pain management (avoid narcotics)


Aggressive mobilization


Cont care per primary team


Surgery Team to cont following








xray reviewed with attending, slightly improved, will likely remove ngt in am





d/w attending Dr Avalos





<Brendon Boyle - Last Filed: 07/15/20 16:06>





- Note


Progress Note: 





Attending Surgeon:


I personally saw and examined the patient. My examination reveals a patient with

ongoing SBO. I discussed the case with the surgical PA and agree with their 

findings and plan of care with any exceptions as noted. ~ Pedro Luis Avalos MD, 

FACS 





<Pedro Luis Avalos - Last Filed: 07/17/20 10:38>

## 2020-07-15 NOTE — PN
Progress Note (short form)





- Note


Progress Note: 





s: no cp sob palps dizzy





                                        


                              Current Medications











Generic Name Dose Route Start Last Admin





  Trade Name Freq  PRN Reason Stop Dose Admin


 


Acetaminophen  650 mg  07/11/20 05:14  07/14/20 19:02





  Tylenol -  PO   650 mg





  Q4H PRN   Administration





  PAIN LEVEL 4 - 6  


 


Amlodipine Besylate  10 mg  07/11/20 10:00  07/15/20 09:36





  Norvasc -  PO   10 mg





  DAILY [facility]   Administration


 


Aspirin  81 mg  07/11/20 10:00  07/14/20 09:50





  Ecotrin -  PO   Not Given





  DAILY JULIA  


 


Atorvastatin Calcium  40 mg  07/11/20 22:00  07/14/20 21:38





  Lipitor -  PO   40 mg





  HS [facility]   Administration


 


Benzocaine/Menthol  1 each  07/13/20 16:21  07/13/20 16:46





  Cepacol Lozenge -  MM   1 each





  PRN PRN   Administration





  SORE THROAT  


 


Cholecalciferol  1,000 unit  07/11/20 10:00  07/15/20 09:42





  Vitamin D3 -  PO   1,000 unit





  DAILY [facility]   Administration


 


Cyanocobalamin  100 mcg  07/11/20 10:00  07/15/20 09:41





  Vitamin B12 -  PO   100 mcg





  DAILY [facility]   Administration


 


Hydralazine HCl  10 mg  07/14/20 15:00  07/14/20 15:33





  Apresoline Injection -  IVPB   10 mg





  Q8H PRN   Administration





  HYPERTENSION  


 


Sodium Chloride  1,000 mls @ 100 mls/hr  07/14/20 08:00  07/15/20 11:27





  Normal Saline -  IV   Not Given





  ASDIR [facility]  


 


Insulin Aspart  1 vial  07/11/20 07:00  07/15/20 06:07





  Novolog Vial Sliding Scale -  SQ   Not Given





  [facility]  





  Protocol  


 


Lisinopril  20 mg  07/11/20 10:00  07/15/20 09:36





  Prinivil  PO   20 mg





  DAILY [facility]   Administration


 


Pantoprazole Sodium  40 mg  07/12/20 22:00  07/15/20 09:35





  Protonix Iv  IVPUSH   40 mg





  BID [facility]   Administration


 


Tramadol HCl  50 mg  07/12/20 19:10  07/14/20 19:03





  Ultram -  PO   50 mg





  Q8H PRN   Administration





  PAIN LEVEL 7 - 10  








                                   Vital Signs











 Period  Temp  Pulse  Resp  BP Sys/Vasquez  Pulse Ox


 


 Last 24 Hr  98 F-99.8 F  49-60  16-18  144-194/63-79  95-97











nad no jvd


rrr s1s2 no mrg


cta bl nl eff


aao3


no le e/c/c


abd nd nt


no jaundice diaphoresis





                                        


tele: sinus naty





ecg: wnl





cxr: clear 





echo 7/2020 mild LVH, nl LV function, E/A reversal, mild AR/MR








a/p:  65 f hx htn, dm, cad s/p pci 2008 (after +stress test, no MI), exlap for 

prior sbo, here with abd pain, vomiting.





cad s/p remote pci, now with +trops/nstemi:


-pt has no cardiac sxs but trop mildly elevated, peaked at 1.8.  ECG 

unremarkable.  Possible demand ischemia from acute issues but given cad hx 

possibly nstemi as well.  Started hep gtt 7/11 but then had coffee ground fluids

in ngt so hep gtt stopped.   Cont statin, bb, ace. 


-cont tele


-will need ischemic eval, most likely with nuclear stress test, when acute 

issues resolved





htn:


- dc'd atenolol for bradycardia in the 40s


- cont current meds, PRN IV hydralazine given she has NGT to LWS and may not 

have adequate absorption of PO meds





hld:


-cont statin





sbo:


-surgery following, attempting conservative management for now

## 2020-07-15 NOTE — PN
Physical Exam: 


SUBJECTIVE: 


Patient seen and examined at bedside.  The patent denies fever/chills, abdominal

pain, and nausea/vomiting.  The container for her NG tube still shows coffee 

ground substance in the NG tube secretions.








OBJECTIVE:





                                   Vital Signs











 Period  Temp  Pulse  Resp  BP Sys/Vasquez  Pulse Ox


 


 Last 24 Hr  98 F-99.3 F  53-61  16-18  151-170/63-70  95-97











GENERAL: The patient is awake, alert, and fully oriented, in no acute distress.


HEAD: Normal with no signs of trauma.


EYES: Extraocular movements intact. No ptosis. 


ENT: Ears normal, nares patent, oropharynx clear without exudates, moist mucous 

membranes.


NECK: Trachea midline, full range of motion, supple. 


LUNGS: Breath sounds equal, clear to auscultation bilaterally, no wheezes, no 

crackles, no accessory muscle use. 


HEART: Regular rate and rhythm, S1, S2 without murmur, rub or gallop.


ABDOMEN: Soft, nontender, nondistended, normoactive bowel sounds, no guarding, 

no rebound, no masses.


EXTREMITIES: 2+ pulses, warm, well-perfused, no edema. 


NEUROLOGICAL: Normal speech, gait not observed.


PSYCH: Normal mood, normal affect.


SKIN: Warm, dry, normal turgor, no rashes or lesions noted














                         Laboratory Results - last 24 hr











  07/14/20 07/15/20 07/15/20





  21:44 05:50 05:50


 


WBC   9.3 


 


RBC   4.46 


 


Hgb   12.0 


 


Hct   37.5 


 


MCV   84.0 


 


MCH   26.9 


 


MCHC   32.0 


 


RDW   14.0 


 


Plt Count   194 


 


MPV   8.7 


 


PTT (Actin FS)    Cancelled


 


Sodium   


 


Potassium   


 


Chloride   


 


Carbon Dioxide   


 


Anion Gap   


 


BUN   


 


Creatinine   


 


Est GFR (CKD-EPI)AfAm   


 


Est GFR (CKD-EPI)NonAf   


 


POC Glucometer  112  


 


Random Glucose   


 


Calcium   


 


Phosphorus   


 


Magnesium   














  07/15/20 07/15/20 07/15/20





  05:50 06:03 07:28


 


WBC   


 


RBC   


 


Hgb   


 


Hct   


 


MCV   


 


MCH   


 


MCHC   


 


RDW   


 


Plt Count   


 


MPV   


 


PTT (Actin FS)    28.9


 


Sodium  139  


 


Potassium  3.2 L  


 


Chloride  103  


 


Carbon Dioxide  26  


 


Anion Gap  10  


 


BUN  7.8  


 


Creatinine  0.6  


 


Est GFR (CKD-EPI)AfAm  109.31  


 


Est GFR (CKD-EPI)NonAf  94.32  


 


POC Glucometer   126 


 


Random Glucose  133 H  


 


Calcium  9.1  


 


Phosphorus  3.0  


 


Magnesium  1.7 L  














  07/15/20





  12:59


 


WBC 


 


RBC 


 


Hgb 


 


Hct 


 


MCV 


 


MCH 


 


MCHC 


 


RDW 


 


Plt Count 


 


MPV 


 


PTT (Actin FS) 


 


Sodium 


 


Potassium  3.2 L


 


Chloride 


 


Carbon Dioxide 


 


Anion Gap 


 


BUN 


 


Creatinine 


 


Est GFR (CKD-EPI)AfAm 


 


Est GFR (CKD-EPI)NonAf 


 


POC Glucometer 


 


Random Glucose 


 


Calcium 


 


Phosphorus 


 


Magnesium  1.5 L








Active Medications











Generic Name Dose Route Start Last Admin





  Trade Name Freq  PRN Reason Stop Dose Admin


 


Acetaminophen  650 mg  07/11/20 05:14  07/14/20 19:02





  Tylenol -  PO   650 mg





  Q4H PRN   Administration





  PAIN LEVEL 4 - 6  


 


Amlodipine Besylate  10 mg  07/11/20 10:00  07/15/20 09:36





  Norvasc -  PO   10 mg





  DAILY [facility]   Administration


 


Aspirin  81 mg  07/11/20 10:00  07/14/20 09:50





  Ecotrin -  PO   Not Given





  [facility]ILY [facility]  


 


Atorvastatin Calcium  40 mg  07/11/20 22:00  07/14/20 21:38





  Lipitor -  PO   40 mg





  HS JULIA   Administration


 


Benzocaine/Menthol  1 each  07/13/20 16:21  07/13/20 16:46





  Cepacol Lozenge -  MM   1 each





  PRN PRN   Administration





  SORE THROAT  


 


Cholecalciferol  1,000 unit  07/11/20 10:00  07/15/20 09:42





  Vitamin D3 -  PO   1,000 unit





  DAILY [facility]   Administration


 


Cyanocobalamin  100 mcg  07/11/20 10:00  07/15/20 09:41





  Vitamin B12 -  PO   100 mcg





  DAILY [facility]   Administration


 


Hydralazine HCl  10 mg  07/14/20 15:00  07/14/20 15:33





  Apresoline Injection -  IVPB   10 mg





  Q8H PRN   Administration





  HYPERTENSION  


 


Sodium Chloride  1,000 mls @ 100 mls/hr  07/14/20 08:00  07/15/20 11:27





  Normal Saline -  IV   Not Given





  [facility]  


 


Potassium Chloride  10 meq in 100 mls @ 100 mls/hr  07/15/20 17:30 





  Potassium Chloride 10 Meq Premix Ivpb -  IVPB  07/15/20 20:29 





  [facility]  


 


Insulin Aspart  1 vial  07/11/20 07:00  07/15/20 17:11





  Novolog Vial Sliding Scale -  SQ   Not Given





  [facility]  





  Protocol  


 


Lisinopril  20 mg  07/11/20 10:00  07/15/20 09:36





  Prinivil  PO   20 mg





  DAILY [facility]   Administration


 


Pantoprazole Sodium  40 mg  07/12/20 22:00  07/15/20 09:35





  Protonix Iv  IVPUSH   40 mg





  BID [facility]UL[facility]   Administration


 


Tramadol HCl  50 mg  07/12/20 19:10  07/14/20 19:03





  Ultram -  PO   50 mg





  Q8H PRN   Administration





  PAIN LEVEL 7 - 10  











ASSESSMENT/PLAN:


65 year old female patent with past medical history that includes HTN, DM, CAD 

s/p stents, and multiple episodes of SBOs who presented to the ED with nausea, 

vomiting, and abdominal pain.





1. SBO


- NG tube


- IV fluids


- Repeat Abdominal X-ray shows possible improvement of SBO.


- Continuing conservative management.





2. Tropenemia likely secondary to demand ischemia


- Trop max 1.84


- Cardio following with plan for ischemia workup after resolution of SBO


- The patient is taking aspirin, lipitor, and ace-i.  The patient is off 

Atenolol due to low heart rate.





3. Coffee ground emesis secondary to possible GI bleed


- Hgb 12.0 today


- monitoring Hgb


- Heparin being held


- The patient is taking a PPI


- plan for GI workup eventually after SBO resolves





4. Proteinuria and Hematuria


- Urology outpatient f/u


- Renal ultrasound shows bilateral renal cortical scarring





5. HTN


- The patient is taking amlodipine and lisinopril and hydralazine


- Blood pressure has been better controlled with the Hydralazine.  Most recent 

blood pressure is 151/67.


- Monitoring blood pressure





6. DM


- ISS





DVT PPx - SCDs.  Heparin held due to coffee ground emesis





GI PPx - Protonix





Dispo - Conservative management continuing per surgery.








Visit type





- Emergency Visit


Emergency Visit: Yes


ED Registration Date: 07/11/20


Care time: The patient presented to the Emergency Department on the above date 

and was hospitalized for further evaluation of their emergent condition.





- New Patient


This patient is new to me today: No





- Critical Care


Critical Care patient: No





- Discharge Referral


Referred to [facility] No





ATTENDING PHYSICIAN STATEMENT





I saw and evaluated the patient.


I reviewed the resident's note and discussed the case with the resident.


I agree with the resident's findings and plan as documented.








SUBJECTIVE:








OBJECTIVE:








ASSESSMENT AND PLAN:

## 2020-07-15 NOTE — PN
Teaching Attending Note


Name of Resident: Pranav Jones





ATTENDING PHYSICIAN STATEMENT





I saw and evaluated the patient.


I reviewed the resident's note and discussed the case with the resident.


I agree with the resident's findings and plan as documented.








SUBJECTIVE: Abdominal discomfort much improved. NG tube draining bilious 

material. No fever/chills.








OBJECTIVE: Afebrile, Hemodnamically Stable.





                                Last Vital Signs











Temp Pulse Resp BP Pulse Ox


 


 99.0 F   61   16   151/67   97 


 


 07/15/20 13:27  07/15/20 13:27  07/15/20 13:27  07/15/20 13:27  07/15/20 09:29














HEENT - Atraumatic, Normocephalic. NGT to low intermittent suction.


Heart - S1, S2, RRR


Lungs - clear to auscultation


Abdomen - Soft, non-tender. Bowel Sounds +.


Extremities - no edema, no calf tenderness.


Neuro - AAO x 3. Tone/Power normal all extremities.





                         Laboratory Results - last 24 hr











  07/14/20 07/14/20 07/14/20





  15:05 17:08 21:44


 


WBC   


 


RBC   


 


Hgb   


 


Hct   


 


MCV   


 


MCH   


 


MCHC   


 


RDW   


 


Plt Count   


 


MPV   


 


PTT (Actin FS)   


 


Sodium   


 


Potassium   


 


Chloride   


 


Carbon Dioxide   


 


Anion Gap   


 


BUN   


 


Creatinine   


 


Est GFR (CKD-EPI)AfAm   


 


Est GFR (CKD-EPI)NonAf   


 


POC Glucometer   114  112


 


Random Glucose   


 


Calcium   


 


Phosphorus  2.3 L  


 


Magnesium  1.9  














  07/15/20 07/15/20 07/15/20





  05:50 05:50 05:50


 


WBC  9.3  


 


RBC  4.46  


 


Hgb  12.0  


 


Hct  37.5  


 


MCV  84.0  


 


MCH  26.9  


 


MCHC  32.0  


 


RDW  14.0  


 


Plt Count  194  


 


MPV  8.7  


 


PTT (Actin FS)   Cancelled 


 


Sodium    139


 


Potassium    3.2 L


 


Chloride    103


 


Carbon Dioxide    26


 


Anion Gap    10


 


BUN    7.8


 


Creatinine    0.6


 


Est GFR (CKD-EPI)AfAm    109.31


 


Est GFR (CKD-EPI)NonAf    94.32


 


POC Glucometer   


 


Random Glucose    133 H


 


Calcium    9.1


 


Phosphorus    3.0


 


Magnesium    1.7 L














  07/15/20 07/15/20





  06:03 07:28


 


WBC  


 


RBC  


 


Hgb  


 


Hct  


 


MCV  


 


MCH  


 


MCHC  


 


RDW  


 


Plt Count  


 


MPV  


 


PTT (Actin FS)   28.9


 


Sodium  


 


Potassium  


 


Chloride  


 


Carbon Dioxide  


 


Anion Gap  


 


BUN  


 


Creatinine  


 


Est GFR (CKD-EPI)AfAm  


 


Est GFR (CKD-EPI)NonAf  


 


POC Glucometer  126 


 


Random Glucose  


 


Calcium  


 


Phosphorus  


 


Magnesium  








                               Current Medications











Generic Name Dose Route Start Last Admin





  Trade Name Freq  PRN Reason Stop Dose Admin


 


Acetaminophen  650 mg  07/11/20 05:14  07/14/20 19:02





  Tylenol -  PO   650 mg





  Q4H PRN   Administration





  PAIN LEVEL 4 - 6  


 


Amlodipine Besylate  10 mg  07/11/20 10:00  07/15/20 09:36





  Norvasc -  PO   10 mg





  DAILY [facility]   Administration


 


Aspirin  81 mg  07/11/20 10:00  07/14/20 09:50





  Ecotrin -  PO   Not Given





  DAILY [facility]  


 


Atorvastatin Calcium  40 mg  07/11/20 22:00  07/14/20 21:38





  Lipitor -  PO   40 mg





  HS [facility]   Administration


 


Benzocaine/Menthol  1 each  07/13/20 16:21  07/13/20 16:46





  Cepacol Lozenge -  MM   1 each





  PRN PRN   Administration





  SORE THROAT  


 


Cholecalciferol  1,000 unit  07/11/20 10:00  07/15/20 09:42





  Vitamin D3 -  PO   1,000 unit





  DAILY [facility]   Administration


 


Cyanocobalamin  100 mcg  07/11/20 10:00  07/15/20 09:41





  Vitamin B12 -  PO   100 mcg





  DAILY [facility]   Administration


 


Hydralazine HCl  10 mg  07/14/20 15:00  07/14/20 15:33





  Apresoline Injection -  IVPB   10 mg





  Q8H PRN   Administration





  HYPERTENSION  


 


Sodium Chloride  1,000 mls @ 100 mls/hr  07/14/20 08:00  07/15/20 11:27





  Normal Saline -  IV   Not Given





  ASDIR [facility]  


 


Insulin Aspart  1 vial  07/11/20 07:00  07/15/20 12:05





  Novolog Vial Sliding Scale -  SQ   Not Given





  [facility]  





  Protocol  


 


Lisinopril  20 mg  07/11/20 10:00  07/15/20 09:36





  Prinivil  PO   20 mg





  DAILY [facility]   Administration


 


Pantoprazole Sodium  40 mg  07/12/20 22:00  07/15/20 09:35





  Protonix Iv  IVPUSH   40 mg





  BID [facility]   Administration


 


Tramadol HCl  50 mg  07/12/20 19:10  07/14/20 19:03





  Ultram -  PO   50 mg





  Q8H PRN   Administration





  PAIN LEVEL 7 - 10  








                                Home Medications











 Medication  Instructions  Recorded


 


Aspirin [Aspirin EC] 325 mg PO DAILY 10/22/15


 


Atenolol [Tenormin] 25 mg PO BID 10/22/15


 


Lisinopril [Prinivil -] 20 mg PO DAILY 10/22/15


 


Metformin HCl [Glucophage] 500 mg PO BID 10/22/15


 


Amlodipine Besylate [Norvasc -] 10 mg PO DAILY 01/30/20


 


Atorvastatin Calcium [Lipitor] 40 mg PO HS 01/30/20


 


Ergocalciferol (Vitamin D2) 1,000 unit PO DAILY 01/30/20





[Vitamin D2]  


 


Vitamin B12 100 mg PO DAILY 01/30/20


 


Glimepiride 1 mg PO DAILY 07/11/20


 


Naproxen 500 mg PO Q12H PRN 07/11/20














                                        





ASSESSMENT AND PLAN:





65 year old female with history of HTN, DM 2, CAD s/p Stents 2008, history of 

multiple episodes of SBO, presents with SBO and found to have acute NSTEMI. 





1. Small Bowel Obstruction, recurrent


AXR - pSBO


Continue NPO/IV fluids/NGT decompression


Surgery following.





2. Acute NSTEMI


Likely secondary to demand, TropI max 1.84


Seen by Cardio and started on Heparin drip, eventually stopped due to coffee 

ground blood loss in NGT.


Cardiology following.


On Lipitor/ACE-I. Atenolol stopped due to Bradycardia.


Ischemic work-up after resolution of current acute illness as per Cardio.





3. Acute Blood Loss anemia sec to coffee ground emesis


Gastric Occult Blood +


Heparin drip stopped.


Given SBO and acute MI, unlikely candidate for EGD.


H/H stable - monitor.


Protonix BID.


Eventual GI work-up.





4. Proteinuria and Hematuria


Renal Ultrasound - bilateral renal cortical scarring.


Urology out-patient follow-up





5. HTN - Continue Norvasc, Lisinopril. Atenolol stopped due to Bradycardia. 

Hydralazine IV PRN while NPO.





6. Hypomagnesemia/Hypophosphatemia - recurrent, will replete.





7. DM 2 - Glimepiride held. Maintain on Novolog sliding scale.











DVT Px - SCDs. Heparin drip held.

## 2020-07-16 LAB
ANION GAP SERPL CALC-SCNC: 13 MMOL/L (ref 8–16)
BUN SERPL-MCNC: 8.9 MG/DL (ref 7–18)
CALCIUM SERPL-MCNC: 9.2 MG/DL (ref 8.5–10.1)
CHLORIDE SERPL-SCNC: 107 MMOL/L (ref 98–107)
CO2 SERPL-SCNC: 21 MMOL/L (ref 21–32)
CREAT SERPL-MCNC: 0.7 MG/DL (ref 0.55–1.3)
DEPRECATED RDW RBC AUTO: 13.8 % (ref 11.6–15.6)
GLUCOSE SERPL-MCNC: 103 MG/DL (ref 74–106)
HCT VFR BLD CALC: 34.2 % (ref 32.4–45.2)
HGB BLD-MCNC: 11 GM/DL (ref 10.7–15.3)
MAGNESIUM SERPL-MCNC: 2 MG/DL (ref 1.8–2.4)
MCH RBC QN AUTO: 26.5 PG (ref 25.7–33.7)
MCHC RBC AUTO-ENTMCNC: 32.2 G/DL (ref 32–36)
MCV RBC: 82.3 FL (ref 80–96)
PHOSPHATE SERPL-MCNC: 2.6 MG/DL (ref 2.5–4.9)
PLATELET # BLD AUTO: 212 K/MM3 (ref 134–434)
PMV BLD: 8.5 FL (ref 7.5–11.1)
POTASSIUM SERPLBLD-SCNC: 3.6 MMOL/L (ref 3.5–5.1)
RBC # BLD AUTO: 4.16 M/MM3 (ref 3.6–5.2)
SODIUM SERPL-SCNC: 141 MMOL/L (ref 136–145)
WBC # BLD AUTO: 8.8 K/MM3 (ref 4–10)

## 2020-07-16 RX ADMIN — VITAM B12 SCH MCG: 100 TAB at 10:02

## 2020-07-16 RX ADMIN — LISINOPRIL SCH MG: 20 TABLET ORAL at 10:01

## 2020-07-16 RX ADMIN — INSULIN ASPART SCH: 100 INJECTION, SOLUTION INTRAVENOUS; SUBCUTANEOUS at 17:24

## 2020-07-16 RX ADMIN — BENZOCAINE AND MENTHOL PRN EACH: 15; 3.6 LOZENGE ORAL at 13:43

## 2020-07-16 RX ADMIN — SODIUM CHLORIDE SCH MLS/HR: 9 INJECTION, SOLUTION INTRAVENOUS at 21:10

## 2020-07-16 RX ADMIN — INSULIN ASPART SCH: 100 INJECTION, SOLUTION INTRAVENOUS; SUBCUTANEOUS at 11:49

## 2020-07-16 RX ADMIN — ATORVASTATIN CALCIUM SCH MG: 40 TABLET, FILM COATED ORAL at 21:08

## 2020-07-16 RX ADMIN — POTASSIUM CHLORIDE SCH MLS/HR: 7.46 INJECTION, SOLUTION INTRAVENOUS at 02:09

## 2020-07-16 RX ADMIN — POTASSIUM CHLORIDE SCH MLS/HR: 7.46 INJECTION, SOLUTION INTRAVENOUS at 00:12

## 2020-07-16 RX ADMIN — VITAMIN D, TAB 1000IU (100/BT) SCH UNIT: 25 TAB at 10:01

## 2020-07-16 RX ADMIN — INSULIN ASPART SCH UNITS: 100 INJECTION, SOLUTION INTRAVENOUS; SUBCUTANEOUS at 21:10

## 2020-07-16 RX ADMIN — INSULIN ASPART SCH: 100 INJECTION, SOLUTION INTRAVENOUS; SUBCUTANEOUS at 06:10

## 2020-07-16 RX ADMIN — SODIUM CHLORIDE SCH MLS/HR: 9 INJECTION, SOLUTION INTRAVENOUS at 10:02

## 2020-07-16 RX ADMIN — PANTOPRAZOLE SODIUM SCH MG: 40 INJECTION, POWDER, FOR SOLUTION INTRAVENOUS at 10:02

## 2020-07-16 RX ADMIN — PANTOPRAZOLE SODIUM SCH MG: 40 INJECTION, POWDER, FOR SOLUTION INTRAVENOUS at 21:08

## 2020-07-16 RX ADMIN — AMLODIPINE BESYLATE SCH MG: 10 TABLET ORAL at 10:02

## 2020-07-16 NOTE — PN
Teaching Attending Note


Name of Resident: Pranav Jones





ATTENDING PHYSICIAN STATEMENT





I saw and evaluated the patient.


I reviewed the resident's note and discussed the case with the resident.


I agree with the resident's findings and plan as documented.








SUBJECTIVE: Abdominal discomfort much improved. NG tube removed this AM. No 

further nausea/vomiting. No fever/chills.








OBJECTIVE: Afebrile, Hemodnamically Stable. Comfortable.





                                Last Vital Signs











Temp Pulse Resp BP Pulse Ox


 


 98.8 F   67   18   159/68   100 


 


 07/16/20 09:59  07/16/20 09:59  07/16/20 09:59  07/16/20 09:59  07/16/20 09:59











HEENT - Atraumatic, Normocephalic.


Heart - S1, S2, RRR


Lungs - clear to auscultation


Abdomen - Soft, non-tender. Bowel Sounds +.


Extremities - no edema, no calf tenderness.


Neuro - AAO x 3. Tone/Power normal all extremities.





                         Laboratory Results - last 24 hr











  07/15/20 07/15/20 07/16/20





  12:59 21:46 06:45


 


WBC    8.8


 


RBC    4.16


 


Hgb    11.0


 


Hct    34.2


 


MCV    82.3


 


MCH    26.5


 


MCHC    32.2


 


RDW    13.8


 


Plt Count    212


 


MPV    8.5


 


PTT (Actin FS)   


 


Sodium   


 


Potassium  3.2 L  


 


Chloride   


 


Carbon Dioxide   


 


Anion Gap   


 


BUN   


 


Creatinine   


 


Est GFR (CKD-EPI)AfAm   


 


Est GFR (CKD-EPI)NonAf   


 


POC Glucometer   96 


 


Random Glucose   


 


Calcium   


 


Phosphorus   


 


Magnesium  1.5 L  














  07/16/20 07/16/20





  06:45 06:45


 


WBC  


 


RBC  


 


Hgb  


 


Hct  


 


MCV  


 


MCH  


 


MCHC  


 


RDW  


 


Plt Count  


 


MPV  


 


PTT (Actin FS)  30.8 


 


Sodium   141


 


Potassium   3.6


 


Chloride   107


 


Carbon Dioxide   21


 


Anion Gap   13


 


BUN   8.9


 


Creatinine   0.7


 


Est GFR (CKD-EPI)AfAm   103.91


 


Est GFR (CKD-EPI)NonAf   89.65


 


POC Glucometer  


 


Random Glucose   103


 


Calcium   9.2


 


Phosphorus   2.6


 


Magnesium   2.0








                               Current Medications











Generic Name Dose Route Start Last Admin





  Trade Name Freq  PRN Reason Stop Dose Admin


 


Acetaminophen  650 mg  07/11/20 05:14  07/14/20 19:02





  Tylenol -  PO   650 mg





  Q4H PRN   Administration





  PAIN LEVEL 4 - 6  


 


Amlodipine Besylate  10 mg  07/11/20 10:00  07/16/20 10:02





  Norvasc -  PO   10 mg





  DAILY [facility]   Administration


 


Aspirin  81 mg  07/11/20 10:00  07/14/20 09:50





  Ecotrin -  PO   Not Given





  DAILY [facility]  


 


Atorvastatin Calcium  40 mg  07/11/20 22:00  07/15/20 21:36





  Lipitor -  PO   40 mg





  [facility]   Administration


 


Benzocaine/Menthol  1 each  07/13/20 16:21  07/13/20 16:46





  Cepacol Lozenge -  MM   1 each





  PRN PRN   Administration





  SORE THROAT  


 


Cholecalciferol  1,000 unit  07/11/20 10:00  07/16/20 10:01





  Vitamin D3 -  PO   1,000 unit





  DAILY [facility]   Administration


 


Cyanocobalamin  100 mcg  07/11/20 10:00  07/16/20 10:02





  Vitamin B12 -  PO   100 mcg





  DAILY [facility]   Administration


 


Hydralazine HCl  10 mg  07/14/20 15:00  07/14/20 15:33





  Apresoline Injection -  IVPB   10 mg





  Q8H PRN   Administration





  HYPERTENSION  


 


Sodium Chloride  1,000 mls @ 100 mls/hr  07/14/20 08:00  07/16/20 10:02





  Normal Saline -  IV   100 mls/hr





  ASDIR [facility]   Administration


 


Insulin Aspart  1 vial  07/11/20 07:00  07/16/20 11:49





  Novolog Vial Sliding Scale -  SQ   Not Given





  [facility]  





  Protocol  


 


Lisinopril  20 mg  07/11/20 10:00  07/16/20 10:01





  Prinivil  PO   20 mg





  DAILY [facility]   Administration


 


Pantoprazole Sodium  40 mg  07/12/20 22:00  07/16/20 10:02





  Protonix Iv  IVPUSH   40 mg





  BID [facility]   Administration








                                Home Medications











 Medication  Instructions  Recorded


 


Aspirin [Aspirin EC] 325 mg PO DAILY 10/22/15


 


Atenolol [Tenormin] 25 mg PO BID 10/22/15


 


Lisinopril [Prinivil -] 20 mg PO DAILY 10/22/15


 


Metformin HCl [Glucophage] 500 mg PO BID 10/22/15


 


Amlodipine Besylate [Norvasc -] 10 mg PO DAILY 01/30/20


 


Atorvastatin Calcium [Lipitor] 40 mg PO HS 01/30/20


 


Ergocalciferol (Vitamin D2) 1,000 unit PO DAILY 01/30/20





[Vitamin D2]  


 


Vitamin B12 100 mg PO DAILY 01/30/20


 


Glimepiride 1 mg PO DAILY 07/11/20


 


Naproxen 500 mg PO Q12H PRN 07/11/20














                                        





ASSESSMENT AND PLAN:





65 year old female with history of HTN, DM 2, CAD s/p Stents 2008, history of 

multiple episodes of SBO, presents with SBO and found to have acute NSTEMI. 





1. Small Bowel Obstruction, recurrent


AXR - pSBO


NGT removed.


Continue NPO/IV fluids as per Sx


Surgery following. Advancement of diet as per Surgery.





2. Acute NSTEMI


Likely secondary to demand, TropI max 1.84


Seen by Cardio and started on Heparin drip, eventually stopped due to coffee 

ground blood loss in NGT.


Cardiology following.


On Lipitor/ACE-I. Atenolol stopped due to Bradycardia.


Ischemic work-up after resolution of current acute illness as per Cardio.





3. Acute Blood Loss anemia sec to coffee ground emesis


Gastric Occult Blood +


Heparin drip stopped.


Given SBO and acute MI, unlikely candidate for EGD currently.


H/H stable - monitor.


Protonix BID.


Eventual GI work-up.





4. Proteinuria and Hematuria


Renal Ultrasound - bilateral renal cortical scarring.


Urology out-patient follow-up





5. HTN - Continue Norvasc, Lisinopril. Atenolol stopped due to Bradycardia. 

Hydralazine IV PRN while NPO.





6. Hypomagnesemia/Hypophosphatemia - repleted.





7. DM 2 - Glimepiride held. Maintain on Novolog sliding scale.











DVT Px - SCDs. Heparin drip held.

## 2020-07-16 NOTE — PN
Progress Note (short form)





- Note


Progress Note: 














Pt seen and examined. Reports she had a "tough night" due to discomfort with NGT

and throat dryness. Has been OOb without issue. Denies cp/sob, n/v/d. Passing 

flatus. NO BM yet











                                   Vital Signs











Temp  98.8 F   07/16/20 05:00


 


Pulse  71   07/16/20 05:00


 


Resp  18   07/16/20 05:00


 


BP  154/65   07/16/20 05:00


 


Pulse Ox  98   07/15/20 21:00








                                 Intake & Output











 07/15/20 07/15/20 07/16/20





 11:59 23:59 11:59


 


Intake Total 


 


Output Total 50 25 30


 


Balance 


 


Intake:   


 


   [id number]


 


    LH #24 7/15  500 


 


    Normal Saline - 1,000 ml 700  1200





    @ 100 mls/hr IV ASDIR [facility]   





    Rx#:[id number]   


 


  IVPB  400 300


 


  Oral  0 


 


Output:   


 


  Gastric Drainage 50 25 30


 


Other:   


 


  Voiding Method Toilet Toilet 


 


  # Unmeasured Voids   


 


    Void 2 1 3


 


  Bowel Movement  No 








                                    CBC, BMP





                                 07/16/20 06:45 





                                 07/16/20 06:45 

















Gen: awake, alert, nad


Resp: unlabored on ra


Abdo: ngt in place with approx 25ml since yesterday bilious. Abdo minimally ttp 

diffusely, mildly distended. No rebound or guarding. Hypoactive bowel sounds














A/P:   


66 y/o F w/ PMHx HTN, DM, herniated disc, sciatica, restless leg syndrome, 

herniated disk, exploratory laparatomy, Hx of SBO, who prersented to the ED with

nausea, vomiting, and abdominal pain, found to have sbo. 





-axr pending for this am, slightly improved


NGT pulled this AM








clears for dinner


IVF


GI PPx


DVT PPx


Serial abd exams


Replete lytes PRN


MOnitor/record output q shift


Pain management (avoid narcotics)


Aggressive mobilization


Cont care per primary team


Surgery Team to cont following











d/w attending Dr Avalos





<Brendon Boyle - Last Filed: 07/16/20 14:39>





- Note


Progress Note: 





Attending Surgeon:


I personally saw and examined the patient. My examination reveals a patient with

ongoing SBO. I discussed the case with the surgical PA and agree with their 

findings and plan of care with any exceptions as noted. ~ Pedro Luis Avalos MD, 

FACS 





<Pedro Luis Avalos - Last Filed: 07/17/20 10:37>

## 2020-07-16 NOTE — PN
Progress Note (short form)





- Note


Progress Note: 


s: no cp sob palps dizzy





                                        


                               Current Medications











Generic Name Dose Route Start Last Admin





  Trade Name Freq  PRN Reason Stop Dose Admin


 


Acetaminophen  650 mg  07/11/20 05:14  07/14/20 19:02





  Tylenol -  PO   650 mg





  Q4H PRN   Administration





  PAIN LEVEL 4 - 6  


 


Amlodipine Besylate  10 mg  07/11/20 10:00  07/16/20 10:02





  Norvasc -  PO   10 mg





  DAILY [facility]   Administration


 


Aspirin  81 mg  07/11/20 10:00  07/14/20 09:50





  Ecotrin -  PO   Not Given





  DAILY [facility]  


 


Atorvastatin Calcium  40 mg  07/11/20 22:00  07/15/20 21:36





  Lipitor -  PO   40 mg





  [facility]   Administration


 


Benzocaine/Menthol  1 each  07/13/20 16:21  07/13/20 16:46





  Cepacol Lozenge -  MM   1 each





  PRN PRN   Administration





  SORE THROAT  


 


Cholecalciferol  1,000 unit  07/11/20 10:00  07/16/20 10:01





  Vitamin D3 -  PO   1,000 unit





  DAILY [facility]   Administration


 


Cyanocobalamin  100 mcg  07/11/20 10:00  07/16/20 10:02





  Vitamin B12 -  PO   100 mcg





  DAILY [facility]   Administration


 


Hydralazine HCl  10 mg  07/14/20 15:00  07/14/20 15:33





  Apresoline Injection -  IVPB   10 mg





  Q8H PRN   Administration





  HYPERTENSION  


 


Sodium Chloride  1,000 mls @ 100 mls/hr  07/14/20 08:00  07/16/20 10:02





  Normal Saline -  IV   100 mls/hr





  ASDIR [facility]   Administration


 


Insulin Aspart  1 vial  07/11/20 07:00  07/16/20 06:10





  Novolog Vial Sliding Scale -  SQ   Not Given





  [facility]  





  Protocol  


 


Lisinopril  20 mg  07/11/20 10:00  07/16/20 10:01





  Prinivil  PO   20 mg





  DAILY [facility]   Administration


 


Pantoprazole Sodium  40 mg  07/12/20 22:00  07/16/20 10:02





  Protonix Iv  IVPUSH   40 mg





  BID [facility]   Administration








                                   Vital Signs











 Period  Temp  Pulse  Resp  BP Sys/Vasquez  Pulse Ox


 


 Last 24 Hr  97.7 F-99.4 F  59-71  16-18  139-159/61-70  











nad no jvd


rrr s1s2 no mrg


cta bl nl eff


aao3


no le e/c/c


abd nd nt


no jaundice diaphoresis





                                        


                                    CBC, BMP





                                 07/16/20 06:45 





                                 07/16/20 06:45 














tele: sr





ecg: wnl





cxr: clear 











a/p:  65 f hx htn, dm, cad s/p pci 2008 (after +stress test, no MI), exlap for 

prior sbo, here with abd pain, vomiting.





cad s/p remote pci, now with +trops/nstemi:


-pt has no cardiac sxs but trop mildly elevated, peaked at 1.8.  ECG 

unremarkable.  Possible demand ischemia from acute issues but given cad hx po

ssibly nstemi as well.  Started hep gtt 7/11 but then had coffee ground fluids 

in ngt so hep gtt stopped.   Cont statin, bb, ace. 


-cont tele


-will need ischemic eval, most likely with nuclear stress test, when acute 

issues resolved





htn:


- dc'd atenolol for bradycardia in the 40s


- cont current meds





hld:


-cont statin





sbo:


-surgery following, attempting conservative management for now, has been 

improving

## 2020-07-16 NOTE — PN
Physical Exam: 


SUBJECTIVE: 


Patient seen and examined at bedside in the morning.  The patient's NG tube 

removed about 25 mL of gastric fluid overnight.  The surgery team removed the NG

tube today after reviewing the results of the repeat abdominal x-ray.  The 

patient reports not yet having a bowel movement, but being able to pass gas.  

The patient denied fever/chills, nausea/vomiting, shortness of breath, and chest

pain.








OBJECTIVE:





                                   Vital Signs











 Period  Temp  Pulse  Resp  BP Sys/Vasquez  Pulse Ox


 


 Last 24 Hr  97.7 F-99.4 F  59-71  16-18  139-159/61-70  











GENERAL: The patient is awake, alert, and fully oriented, in no acute distress.


HEAD: Normal with no signs of trauma.


EYES: Extraocular movements intact. No ptosis. 


ENT: Ears normal, nares patent, oropharynx clear without exudates, moist mucous 

membranes.


NECK: Trachea midline, full range of motion, supple. 


LUNGS: Breath sounds equal, clear to auscultation bilaterally, no wheezes, no 

crackles, no accessory muscle use. 


HEART: Regular rate and rhythm, S1, S2 without murmur, rub or gallop.


ABDOMEN: Soft, nontender, distended, hypoactive bowel sounds, no guarding, no 

rebound, no masses.


EXTREMITIES: 2+ pulses, warm, well-perfused, no edema. 


NEUROLOGICAL: Normal speech, gait not observed.


PSYCH: Normal mood, normal affect.


SKIN: Warm, dry, normal turgor, no rashes or lesions noted














                         Laboratory Results - last 24 hr











  07/15/20 07/16/20 07/16/20





  21:46 06:45 06:45


 


WBC   8.8 


 


RBC   4.16 


 


Hgb   11.0 


 


Hct   34.2 


 


MCV   82.3 


 


MCH   26.5 


 


MCHC   32.2 


 


RDW   13.8 


 


Plt Count   212 


 


MPV   8.5 


 


PTT (Actin FS)    30.8


 


Sodium   


 


Potassium   


 


Chloride   


 


Carbon Dioxide   


 


Anion Gap   


 


BUN   


 


Creatinine   


 


Est GFR (CKD-EPI)AfAm   


 


Est GFR (CKD-EPI)NonAf   


 


POC Glucometer  96  


 


Random Glucose   


 


Calcium   


 


Phosphorus   


 


Magnesium   














  07/16/20





  06:45


 


WBC 


 


RBC 


 


Hgb 


 


Hct 


 


MCV 


 


MCH 


 


MCHC 


 


RDW 


 


Plt Count 


 


MPV 


 


PTT (Actin FS) 


 


Sodium  141


 


Potassium  3.6


 


Chloride  107


 


Carbon Dioxide  21


 


Anion Gap  13


 


BUN  8.9


 


Creatinine  0.7


 


Est GFR (CKD-EPI)AfAm  103.91


 


Est GFR (CKD-EPI)NonAf  89.65


 


POC Glucometer 


 


Random Glucose  103


 


Calcium  9.2


 


Phosphorus  2.6


 


Magnesium  2.0








Active Medications











Generic Name Dose Route Start Last Admin





  Trade Name Freq  PRN Reason Stop Dose Admin


 


Acetaminophen  650 mg  07/11/20 05:14  07/14/20 19:02





  Tylenol -  PO   650 mg





  Q4H PRN   Administration





  PAIN LEVEL 4 - 6  


 


Amlodipine Besylate  10 mg  07/11/20 10:00  07/16/20 10:02





  Norvasc -  PO   10 mg





  DAILY [facility]   Administration


 


Aspirin  81 mg  07/11/20 10:00  07/14/20 09:50





  Ecotrin -  PO   Not Given





  DAILY [facility]  


 


Atorvastatin Calcium  40 mg  07/11/20 22:00  07/15/20 21:36





  Lipitor -  PO   40 mg





  [facility]   Administration


 


Benzocaine/Menthol  1 each  07/13/20 16:21  07/16/20 13:43





  Cepacol Lozenge -  MM   1 each





  PRN PRN   Administration





  SORE THROAT  


 


Cholecalciferol  1,000 unit  07/11/20 10:00  07/16/20 10:01





  Vitamin D3 -  PO   1,000 unit





  DAILY [facility]   Administration


 


Cyanocobalamin  100 mcg  07/11/20 10:00  07/16/20 10:02





  Vitamin B12 -  PO   100 mcg





  DAILY [facility]   Administration


 


Hydralazine HCl  10 mg  07/14/20 15:00  07/14/20 15:33





  Apresoline Injection -  IVPB   10 mg





  Q8H PRN   Administration





  HYPERTENSION  


 


Sodium Chloride  1,000 mls @ 100 mls/hr  07/14/20 08:00  07/16/20 10:02





  Normal Saline -  IV   100 mls/hr





  ASDIR [facility]   Administration


 


Insulin Aspart  1 vial  07/11/20 07:00  07/16/20 11:49





  Novolog Vial Sliding Scale -  SQ   Not Given





  [facility]  





  Protocol  


 


Lisinopril  20 mg  07/11/20 10:00  07/16/20 10:01





  Prinivil  PO   20 mg





  DAILY [facility]   Administration


 


Pantoprazole Sodium  40 mg  07/12/20 22:00  07/16/20 10:02





  Protonix Iv  IVPUSH   40 mg





  BID [facility]   Administration











ASSESSMENT/PLAN:


65 year old female patent with past medical history that includes HTN, DM, CAD 

s/p stents, and multiple episodes of SBOs who presented to the ED with nausea, 

vomiting, and abdominal pain.





1. SBO


- Abdominal x-ray today shows "no sign of an obstruction" with "an improvement 

in bowel appearance" since prior x-rays.


- NG tube removed today


- Diet advanced per Surgery team


- IV fluids


- Continuing conservative management.





2. Tropenemia likely secondary to demand ischemia


- Trop max 1.84


- Cardio following with plan for ischemia workup after resolution of SBO


- The patient is taking aspirin, lipitor, and ace-i.  The patient is off 

Atenolol due to low heart rate.





3. Coffee ground emesis secondary to possible GI bleed


- Hgb 11.0 today


- monitoring Hgb


- Heparin being held


- The patient is taking a PPI


- plan for GI workup eventually after SBO resolves





4. Proteinuria and Hematuria


- Renal ultrasound shows bilateral renal cortical scarring


- Urology outpatient f/u





5. HTN


- The patient is taking amlodipine and lisinopril and hydralazine


- Blood pressure has been better controlled with the Hydralazine.  Most recent 

blood pressure is 146/67.


- Monitoring blood pressure





6. DM


- ISS





7. Hypomagnesemia/Hypophosphatemia


- Monitoring levels


- Repleted





#FEN - IV fluids.  Monitoring electrolytes.  Clear liquid diet.





DVT PPx - SCDs.  Heparin held due to coffee ground emesis





GI PPx - Protonix





Dispo - Conservative management











Visit type





- Emergency Visit


Emergency Visit: Yes


ED Registration Date: 07/11/20


Care time: The patient presented to the Emergency Department on the above date 

and was hospitalized for further evaluation of their emergent condition.





- New Patient


This patient is new to me today: No





- Critical Care


Critical Care patient: No





- Discharge Referral


Referred to [facility] No





ATTENDING PHYSICIAN STATEMENT





I saw and evaluated the patient.


I reviewed the resident's note and discussed the case with the resident.


I agree with the resident's findings and plan as documented.








SUBJECTIVE:








OBJECTIVE:








ASSESSMENT AND PLAN:

## 2020-07-17 LAB
ANION GAP SERPL CALC-SCNC: 7 MMOL/L (ref 8–16)
BUN SERPL-MCNC: 7.1 MG/DL (ref 7–18)
CALCIUM SERPL-MCNC: 9 MG/DL (ref 8.5–10.1)
CHLORIDE SERPL-SCNC: 108 MMOL/L (ref 98–107)
CO2 SERPL-SCNC: 26 MMOL/L (ref 21–32)
CREAT SERPL-MCNC: 0.7 MG/DL (ref 0.55–1.3)
DEPRECATED RDW RBC AUTO: 13.7 % (ref 11.6–15.6)
GLUCOSE SERPL-MCNC: 131 MG/DL (ref 74–106)
HCT VFR BLD CALC: 31 % (ref 32.4–45.2)
HGB BLD-MCNC: 10.1 GM/DL (ref 10.7–15.3)
MAGNESIUM SERPL-MCNC: 1.5 MG/DL (ref 1.8–2.4)
MCH RBC QN AUTO: 26.5 PG (ref 25.7–33.7)
MCHC RBC AUTO-ENTMCNC: 32.6 G/DL (ref 32–36)
MCV RBC: 81.3 FL (ref 80–96)
PHOSPHATE SERPL-MCNC: 2.5 MG/DL (ref 2.5–4.9)
PLATELET # BLD AUTO: 210 K/MM3 (ref 134–434)
PMV BLD: 8.4 FL (ref 7.5–11.1)
POTASSIUM SERPLBLD-SCNC: 3.3 MMOL/L (ref 3.5–5.1)
RBC # BLD AUTO: 3.81 M/MM3 (ref 3.6–5.2)
SODIUM SERPL-SCNC: 140 MMOL/L (ref 136–145)
WBC # BLD AUTO: 6.6 K/MM3 (ref 4–10)

## 2020-07-17 PROCEDURE — 0DP6X0Z REMOVAL OF DRAINAGE DEVICE FROM STOMACH, EXTERNAL APPROACH: ICD-10-PCS | Performed by: GENERAL ACUTE CARE HOSPITAL

## 2020-07-17 RX ADMIN — INSULIN ASPART SCH UNITS: 100 INJECTION, SOLUTION INTRAVENOUS; SUBCUTANEOUS at 17:19

## 2020-07-17 RX ADMIN — POTASSIUM CHLORIDE SCH MLS/HR: 7.46 INJECTION, SOLUTION INTRAVENOUS at 14:10

## 2020-07-17 RX ADMIN — SODIUM CHLORIDE SCH MLS/HR: 9 INJECTION, SOLUTION INTRAVENOUS at 08:17

## 2020-07-17 RX ADMIN — PANTOPRAZOLE SODIUM SCH MG: 40 INJECTION, POWDER, FOR SOLUTION INTRAVENOUS at 09:40

## 2020-07-17 RX ADMIN — POTASSIUM CHLORIDE SCH MLS/HR: 7.46 INJECTION, SOLUTION INTRAVENOUS at 10:30

## 2020-07-17 RX ADMIN — INSULIN ASPART SCH: 100 INJECTION, SOLUTION INTRAVENOUS; SUBCUTANEOUS at 21:26

## 2020-07-17 RX ADMIN — POTASSIUM CHLORIDE SCH: 7.46 INJECTION, SOLUTION INTRAVENOUS at 18:03

## 2020-07-17 RX ADMIN — AMLODIPINE BESYLATE SCH MG: 10 TABLET ORAL at 09:39

## 2020-07-17 RX ADMIN — PANTOPRAZOLE SODIUM SCH MG: 40 INJECTION, POWDER, FOR SOLUTION INTRAVENOUS at 21:26

## 2020-07-17 RX ADMIN — LISINOPRIL SCH MG: 20 TABLET ORAL at 09:39

## 2020-07-17 RX ADMIN — INSULIN ASPART SCH: 100 INJECTION, SOLUTION INTRAVENOUS; SUBCUTANEOUS at 08:15

## 2020-07-17 RX ADMIN — ATORVASTATIN CALCIUM SCH MG: 40 TABLET, FILM COATED ORAL at 21:26

## 2020-07-17 RX ADMIN — INSULIN ASPART SCH UNITS: 100 INJECTION, SOLUTION INTRAVENOUS; SUBCUTANEOUS at 12:42

## 2020-07-17 RX ADMIN — VITAM B12 SCH MCG: 100 TAB at 09:40

## 2020-07-17 RX ADMIN — VITAMIN D, TAB 1000IU (100/BT) SCH UNIT: 25 TAB at 09:40

## 2020-07-17 NOTE — CON.GI
Consult


Consult Specialty:: Gastroenterology ( covering the [facility] GI service)


Referred by:: DR Hernandez


Reason for Consultation:: coffee grd emesis





- History of Present Illness


Chief Complaint: abdominal distension and vomiting


History of Present Illness: 





65F presented with coffe grd emnesis due to SBO. No melena. She has been managed

conservatively and is tolerating a liquid diet. Had nonrevealing EGD with DR Frazier several years ago. Had colonoscopy 9/19 at [facility] with no abnormalities.

Had unrevealing exploratory laparotomy for SBO at [facility] and 

subsequent SBO managed conservatively  





- History Source


History Provided By: Patient


Limitations to Obtaining History: No Limitations





- Past Medical History


Cardio/Vascular: Yes: CAD (coronary stent x 2), HTN, Hyperlipdemia


Gastrointestinal: Yes: Other (recurring SBO )


Musculoskeletal: Yes: Chronic low back pain


Endocrine: Yes: Diabetes Mellitus





- Past Surgical History


Past Surgical History: Yes: Colonoscopy (DR Jimenez at [facility]- no abnormalities

or polyps), Hysterectomy ([facility] for fibroids), Upper Endoscopy (DR Frazier = no 

abnormalities)


Additional Surgical History: Exploratory laparotomy for SBO at [facility] 

1987. Nothing found or resected.  SBO 1989 managed conservatively





- Alcohol/Substance Use


Hx Alcohol Use: Yes (only major holiday)


History of Substance Use: reports: None





- Smoking History


Smoking history: Former smoker


Have you smoked in the past 12 months: No


If you are a former smoker, when did you quit?: 2000





- Social History


Usual Living Arrangement: Alone


ADL: Independent


Occupation: retired home health aid


Place of Birth: [place]


History of Recent Travel: No





Home Medications





- Allergies


Allergies/Adverse Reactions: 


                                    Allergies











Allergy/AdvReac Type Severity Reaction Status Date / Time


 


No Known Drug Allergies Allergy   Verified 07/10/20 20:58














- Home Medications


Home Medications: 


Ambulatory Orders





Aspirin [Aspirin EC] 325 mg PO DAILY 10/22/15 


Atenolol [Tenormin] 25 mg PO BID 10/22/15 


Lisinopril [Prinivil -] 20 mg PO DAILY 10/22/15 


Metformin HCl [Glucophage] 500 mg PO BID 10/22/15 


Amlodipine Besylate [Norvasc -] 10 mg PO DAILY 01/30/20 


Atorvastatin Calcium [Lipitor] 40 mg PO HS 01/30/20 


Ergocalciferol (Vitamin D2) [Vitamin D2] 1,000 unit PO DAILY 01/30/20 


Vitamin B12 100 mg PO DAILY 01/30/20 


Glimepiride 1 mg PO DAILY 07/11/20 


Naproxen 500 mg PO Q12H PRN 07/11/20 











Family Medical History


Family Hx Diabetes: Mother (father lived to 87)





Review of Systems





- Review of Systems


Constitutional: reports: No Symptoms


Eyes: reports: No Symptoms


HENT: reports: No Symptoms


Neck: reports: No Symptoms


Cardiovascular: reports: No Symptoms


Respiratory: reports: No Symptoms


Gastrointestinal: reports: No Symptoms


Genitourinary: reports: No Symptoms





Physical Exam-GI


Vital Signs: 


                                   Vital Signs











Temperature  98.4 F   07/17/20 13:40


 


Pulse Rate  60   07/17/20 13:40


 


Respiratory Rate  18   07/17/20 13:40


 


Blood Pressure  133/67   07/17/20 13:40


 


O2 Sat by Pulse Oximetry (%)  98   07/17/20 13:40








CBC,CMP











WBC  6.6 K/mm3 (4.0-10.0)   07/17/20  06:05    


 


RBC  3.81 M/mm3 (3.60-5.2)   07/17/20  06:05    


 


Hgb  10.1 GM/dL (10.7-15.3)  L  07/17/20  06:05    


 


Hct  31.0 % (32.4-45.2)  L  07/17/20  06:05    


 


MCV  81.3 fl (80-96)   07/17/20  06:05    


 


MCH  26.5 pg (25.7-33.7)   07/17/20  06:05    


 


MCHC  32.6 g/dl (32.0-36.0)   07/17/20  06:05    


 


RDW  13.7 % (11.6-15.6)   07/17/20  06:05    


 


Plt Count  210 K/MM3 (134-434)   07/17/20  06:05    


 


MPV  8.4 fl (7.5-11.1)   07/17/20  06:05    


 


Absolute Neuts (auto)  3.2 K/mm3 (1.5-8.0)   07/12/20  05:40    


 


Neutrophils %  56.0 % (42.8-82.8)  D 07/12/20  05:40    


 


Lymphocytes %  31.1 % (8-40)  D 07/12/20  05:40    


 


Monocytes %  9.5 % (3.8-10.2)  D 07/12/20  05:40    


 


Eosinophils %  2.2 % (0-4.5)  D 07/12/20  05:40    


 


Basophils %  1.2 % (0-2.0)   07/12/20  05:40    


 


Nucleated RBC %  0 % (0-0)   07/12/20  05:40    


 


Sodium  140 mmol/L (136-145)   07/17/20  06:05    


 


Potassium  3.3 mmol/L (3.5-5.1)  L  07/17/20  06:05    


 


Chloride  108 mmol/L ()  H  07/17/20  06:05    


 


Carbon Dioxide  26 mmol/L (21-32)   07/17/20  06:05    


 


Anion Gap  7 MMOL/L (8-16)  L  07/17/20  06:05    


 


BUN  7.1 mg/dL (7-18)   07/17/20  06:05    


 


Creatinine  0.7 mg/dL (0.55-1.3)   07/17/20  06:05    


 


Est GFR (CKD-EPI)AfAm  103.91   07/17/20  06:05    


 


Est GFR (CKD-EPI)NonAf  89.65   07/17/20  06:05    


 


POC Glucometer  96 UNITS ()   07/15/20  21:46    


 


Random Glucose  131 mg/dL ()  H  07/17/20  06:05    


 


Lactic Acid  1.6 mmol/L (0.4-2.0)   07/11/20  17:37    


 


Calcium  9.0 mg/dL (8.5-10.1)   07/17/20  06:05    


 


Phosphorus  2.5 mg/dL (2.5-4.9)   07/17/20  06:05    


 


Magnesium  1.5 mg/dL (1.8-2.4)  L  07/17/20  06:05    


 


Total Bilirubin  0.6 mg/dL (0.2-1)   07/14/20  05:52    


 


AST  13 U/L (15-37)  L  07/14/20  05:52    


 


ALT  13 U/L (13-61)   07/14/20  05:52    


 


Alkaline Phosphatase  33 U/L ()  L  07/14/20  05:52    


 


Creatine Kinase  159 U/L ()   07/10/20  22:23    


 


Creatine Kinase Index  3.2 % (0.0-5.0)   07/10/20  22:23    


 


CK-MB (CK-2)  5.1 ng/mL (0.5-3.6)  H  07/10/20  22:23    


 


Troponin I  1.14 ng/ml (0.00-0.05)  H*  07/11/20  23:15    


 


Total Protein  6.2 g/dl (6.4-8.2)  L  07/14/20  05:52    


 


Albumin  3.3 g/dl (3.4-5.0)  L  07/14/20  05:52    


 


Lipase  90 U/L ()   07/10/20  22:23    








                               Current Medications











Generic Name Dose Route Start Last Admin





  Trade Name Freq  PRN Reason Stop Dose Admin


 


Acetaminophen  650 mg  07/11/20 05:14  07/14/20 19:02





  Tylenol -  PO   650 mg





  Q4H PRN   Administration





  PAIN LEVEL 4 - 6  


 


Amlodipine Besylate  10 mg  07/11/20 10:00  07/17/20 09:39





  Norvasc -  PO   10 mg





  DAILY JULIA   Administration


 


Aspirin  81 mg  07/11/20 10:00  07/14/20 09:50





  Ecotrin -  PO   Not Given





  DAILY JULIA  


 


Atorvastatin Calcium  40 mg  07/11/20 22:00  07/16/20 21:08





  Lipitor -  PO   40 mg





  HS JULIA   Administration


 


Benzocaine/Menthol  1 each  07/13/20 16:21  07/16/20 13:43





  Cepacol Lozenge -  MM   1 each





  PRN PRN   Administration





  SORE THROAT  


 


Cholecalciferol  1,000 unit  07/11/20 10:00  07/17/20 09:40





  Vitamin D3 -  PO   1,000 unit





  DAILY JULIA   Administration


 


Cyanocobalamin  100 mcg  07/11/20 10:00  07/17/20 09:40





  Vitamin B12 -  PO   100 mcg





  DAILY JULIA   Administration


 


Hydralazine HCl  10 mg  07/14/20 15:00  07/14/20 15:33





  Apresoline Injection -  IVPB   10 mg





  Q8H PRN   Administration





  HYPERTENSION  


 


Insulin Aspart  1 vial  07/11/20 07:00  07/17/20 12:42





  Novolog Vial Sliding Scale -  SQ   6 units





  ACHS JULIA   Administration





  Protocol  


 


Lisinopril  20 mg  07/11/20 10:00  07/17/20 09:39





  Prinivil  PO   20 mg





  DAILY JULIA   Administration


 


Pantoprazole Sodium  40 mg  07/12/20 22:00  07/17/20 09:40





  Protonix Iv  IVPUSH   40 mg





  BID JULIA   Administration











Constitutional: Yes: Calm


Eyes: Yes: Conjunctiva Clear


HENT: Yes: Atraumatic


Neck: Yes: Trachea Midline


Cardiovascular: Yes: Regular Rate and Rhythm


Respiratory: Yes: CTA Bilaterally


Gastrointestinal Inspection: Yes: Scars (long vertical midline and Pfannensteil 

incisions)


...Auscultate: Yes: Normoactive Bowel Sounds


...Palpate: Yes: Soft, Other (nonetdner)


...Rectal Exam: Yes: Deferred (declined)


Labs: 


                                    CBC, BMP





                                 07/17/20 06:05 





                                 07/17/20 06:05 





                                    INR, PTT











INR  1.11  (0.83-1.09)  H  07/10/20  22:23    














Problem List





- Problems


(1) Stented coronary artery


Code(s): Z95.5 - PRESENCE OF CORONARY ANGIOPLASTY IMPLANT AND GRAFT   





(2) Diabetes mellitus


Code(s): E11.9 - TYPE 2 DIABETES MELLITUS WITHOUT COMPLICATIONS   





(3) Hypertension


Code(s): I10 - ESSENTIAL (PRIMARY) HYPERTENSION   





(4) Hyperlipidemia


Code(s): E78.5 - HYPERLIPIDEMIA, UNSPECIFIED   





(5) Small bowel obstruction


Code(s): K56.609 - UNSP INTESTNL OBST, UNSP AS TO PARTIAL VERSUS COMPLETE OBST  







Assessment/Plan





IMpression:


- Recurrent SBO is resolving. The SBO causes small bowel contents to stagnate 

which caused brownish discolorattion. NO signs of GI hemorrhage during the past 

week in the hospital





Plan:


- PPI for stress gastritis prophylaxis


- SBO management as per surgical team





    DR Lamb will be covering thsi weekend

## 2020-07-17 NOTE — PN
Progress Note (short form)





- Note


Progress Note: 





Surgery note:





Pt states that she had a BM last night and this am too. No nausea or emesis. No 

abdominal pain complaints.





                                   Vital Signs











 Period  Temp  Pulse  Resp  BP Sys/Vasquez  Pulse Ox


 


 Last 24 Hr  98.0 F-99.4 F  53-67  16-20  123-159/53-74  








GEN: A&0x3, NAD


ABD:soft, non-distended, non-tender





                                    CBC, BMP





                                 07/17/20 06:05 





                                 07/17/20 06:05 


ABD xray: no sign of obstruction





A/p: 66 yo female with resolved SBO


   Recommend to advance diet as tolerated


   Replete electrotyles, hypokalemia


   D/w Dr. Avalos and agrees with above plan





<Ashley Nunez - Last Filed: 07/18/20 09:25>





- Note


Progress Note: 





Attending Surgeon:


I personally saw and examined the patient. My examination reveals a patient with

sbo. I discussed the case with the surgical PA and agree with their findings and

plan of care with any exceptions as noted. ~ Pedro Luis Avalos MD, FACS 





<Pedro Luis Avalos - Last Filed: 07/18/20 17:33>

## 2020-07-17 NOTE — PN
Physical Exam: 


SUBJECTIVE: 


Patient seen and examined at bedside.  The patient reports feeling much better. 

She was able to have 2 bowel movements.  She also is tolerating her clears diet.

 Her NG tube was removed.  An abdominal x-ray shows the SBO has resolved.  Per a

phone call with Cardiology, Cardiology will now be evaluating the patient 

inpatient for her tropenemia as the patient's SBO has resolved.  The patient 

denies fever/chills, shortness of breath, chest pain, diarrhea, constipation, 

and blood in her stool.








OBJECTIVE:





                                   Vital Signs











 Period  Temp  Pulse  Resp  BP Sys/Vasquez  Pulse Ox


 


 Last 24 Hr  98.0 F-99.1 F  52-63  16-20  123-159/53-74  











GENERAL: The patient is awake, alert, and fully oriented, in no acute distress.


HEAD: Normal with no signs of trauma.


EYES: Extraocular movements intact. No ptosis. 


ENT: Ears normal, nares patent, oropharynx clear without exudates, moist mucous 

membranes.


NECK: Trachea midline, full range of motion, supple. 


LUNGS: Breath sounds equal, clear to auscultation bilaterally, no wheezes, no 

crackles, no accessory muscle use. 


HEART: Regular rate and rhythm, S1, S2 without murmur, rub or gallop.


ABDOMEN: Soft, nontender, nondistended, normoactive bowel sounds, no guarding, 

no rebound, no masses.


EXTREMITIES: 2+ pulses, warm, well-perfused, no edema. 


NEUROLOGICAL: Normal speech, gait not observed.


PSYCH: Normal mood, normal affect.


SKIN: Warm, dry, normal turgor, no rashes or lesions noted














                         Laboratory Results - last 24 hr











  07/16/20 07/17/20 07/17/20





  23:00 06:05 06:05


 


WBC   6.6 


 


RBC   3.81 


 


Hgb   10.1 L 


 


Hct   31.0 L 


 


MCV   81.3 


 


MCH   26.5 


 


MCHC   32.6 


 


RDW   13.7 


 


Plt Count   210 


 


MPV   8.4 


 


PTT (Actin FS)    29.9


 


Sodium   


 


Potassium   


 


Chloride   


 


Carbon Dioxide   


 


Anion Gap   


 


BUN   


 


Creatinine   


 


Est GFR (CKD-EPI)AfAm   


 


Est GFR (CKD-EPI)NonAf   


 


POC Glucometer   


 


Random Glucose   


 


Calcium   


 


Phosphorus   


 


Magnesium   


 


Stool Occult Blood  Negative  














  07/17/20 07/17/20





  06:05 16:25


 


WBC  


 


RBC  


 


Hgb  


 


Hct  


 


MCV  


 


MCH  


 


MCHC  


 


RDW  


 


Plt Count  


 


MPV  


 


PTT (Actin FS)  


 


Sodium  140 


 


Potassium  3.3 L 


 


Chloride  108 H 


 


Carbon Dioxide  26 


 


Anion Gap  7 L 


 


BUN  7.1 


 


Creatinine  0.7 


 


Est GFR (CKD-EPI)AfAm  103.91 


 


Est GFR (CKD-EPI)NonAf  89.65 


 


POC Glucometer   170


 


Random Glucose  131 H 


 


Calcium  9.0 


 


Phosphorus  2.5 


 


Magnesium  1.5 L 


 


Stool Occult Blood  








Active Medications











Generic Name Dose Route Start Last Admin





  Trade Name Freq  PRN Reason Stop Dose Admin


 


Acetaminophen  650 mg  07/11/20 05:14  07/14/20 19:02





  Tylenol -  PO   650 mg





  Q4H PRN   Administration





  PAIN LEVEL 4 - 6  


 


Amlodipine Besylate  10 mg  07/11/20 10:00  07/17/20 09:39





  Norvasc -  PO   10 mg





  DAILY JULIA   Administration


 


Aspirin  81 mg  07/11/20 10:00  07/14/20 09:50





  Ecotrin -  PO   Not Given





  DAILY JULIA  


 


Atorvastatin Calcium  40 mg  07/11/20 22:00  07/16/20 21:08





  Lipitor -  PO   40 mg





  HS JULIA   Administration


 


Benzocaine/Menthol  1 each  07/13/20 16:21  07/16/20 13:43





  Cepacol Lozenge -  MM   1 each





  PRN PRN   Administration





  SORE THROAT  


 


Cholecalciferol  1,000 unit  07/11/20 10:00  07/17/20 09:40





  Vitamin D3 -  PO   1,000 unit





  DAILY JULIA   Administration


 


Cyanocobalamin  100 mcg  07/11/20 10:00  07/17/20 09:40





  Vitamin B12 -  PO   100 mcg





  DAILY JULIA   Administration


 


Hydralazine HCl  10 mg  07/14/20 15:00  07/14/20 15:33





  Apresoline Injection -  IVPB   10 mg





  Q8H PRN   Administration





  HYPERTENSION  


 


Insulin Aspart  1 vial  07/11/20 07:00  07/17/20 17:19





  Novolog Vial Sliding Scale -  SQ   2 units





  ACHS JULIA   Administration





  Protocol  


 


Lisinopril  20 mg  07/11/20 10:00  07/17/20 09:39





  Prinivil  PO   20 mg





  DAILY JLUIA   Administration


 


Pantoprazole Sodium  40 mg  07/12/20 22:00  07/17/20 09:40





  Protonix Iv  IVPUSH   40 mg





  BID JULIA   Administration











ASSESSMENT/PLAN:


65 year old female patent with past medical history that includes HTN, DM, CAD 

s/p stents, and multiple episodes of SBOs who presented to the ED with nausea, 

vomiting, and abdominal pain.





1. SBO - resolved


- Abdominal x-ray today shows resolution of SBO


- NG tube removed


- Diet advanced per Surgery team.  Now is Full liquid diet.





2. Tropenemia likely secondary to demand ischemia


- Trop max 1.84


- The patient is taking aspirin, lipitor, and ace-i.  The patient is off 

Atenolol due to low heart rate.


- Cardiology will now evaluate her inpatient per a phone call with Cardio about 

her tropenemia possibly with a stress test





3. Coffee ground emesis secondary to possible GI bleed


- Hgb 10.1 today


- monitoring Hgb


- Heparin being held


- The patient is taking a PPI


- GI recommends PPI for stress gastritis prophylaxis with SBO management per 

surgery, as GI explains that the emesis was most likely brown secondary to 

stagnation of the GI contents secondary to the SBO.





4. Proteinuria and Hematuria


- Renal ultrasound shows bilateral renal cortical scarring


- Urology outpatient f/u





5. HTN


- The patient is taking amlodipine and lisinopril and hydralazine


- Blood pressure has been better controlled with the Hydralazine.  Most recent 

blood pressure is 140/65.


- Monitoring blood pressure





6. DM


- ISS





7. Hypomagnesemia/Hypophosphatemia


- Monitoring levels


- Repleted





#FEN - IV fluids.  Monitoring electrolytes.  Full liquid diet.





DVT PPx - SCDs.  Heparin held due to coffee ground emesis





GI PPx - Protonix





Dispo - Cardiology will now currently evaluate her inpatient per a phone call 

with Cardio about her tropenemia possibly with a stress test











Visit type





- Emergency Visit


Emergency Visit: Yes


ED Registration Date: 07/11/20


Care time: The patient presented to the Emergency Department on the above date 

and was hospitalized for further evaluation of their emergent condition.





- New Patient


This patient is new to me today: No





- Critical Care


Critical Care patient: No





- Discharge Referral


Referred to [facility] No





ATTENDING PHYSICIAN STATEMENT





I saw and evaluated the patient.


I reviewed the resident's note and discussed the case with the resident.


I agree with the resident's findings and plan as documented.








SUBJECTIVE:








OBJECTIVE:








ASSESSMENT AND PLAN:

## 2020-07-17 NOTE — PN
Progress Note, Physician


Chief Complaint: 





denies CP/SOB/palps








TELE: NSR, APCS


History of Present Illness: 





GIB suspected


Asympt elevated TnI


HTN








Non smoker





- Current Medication List


Current Medications: 


Active Medications





Acetaminophen (Tylenol -)  650 mg PO Q4H PRN


   PRN Reason: PAIN LEVEL 4 - 6


   Last Admin: 07/14/20 19:02 Dose:  650 mg


   Documented by: 


Amlodipine Besylate (Norvasc -)  10 mg PO DAILY [facility]


   Last Admin: 07/16/20 10:02 Dose:  10 mg


   Documented by: 


Aspirin (Ecotrin -)  81 mg PO DAILY [facility]


   Last Admin: 07/14/20 09:50 Dose:  Not Given


   Documented by: 


Atorvastatin Calcium (Lipitor -)  40 mg PO HS [facility]


   Last Admin: 07/16/20 21:08 Dose:  40 mg


   Documented by: 


Benzocaine/Menthol (Cepacol Lozenge -)  1 each MM PRN PRN


   PRN Reason: SORE THROAT


   Last Admin: 07/16/20 13:43 Dose:  1 each


   Documented by: 


Cholecalciferol (Vitamin D3 -)  1,000 unit PO DAILY [facility]


   Last Admin: 07/16/20 10:01 Dose:  1,000 unit


   Documented by: 


Cyanocobalamin (Vitamin B12 -)  100 mcg PO DAILY [facility]


   Last Admin: 07/16/20 10:02 Dose:  100 mcg


   Documented by: 


Hydralazine HCl (Apresoline Injection -)  10 mg IVPB Q8H PRN


   PRN Reason: HYPERTENSION


   Last Admin: 07/14/20 15:33 Dose:  10 mg


   Documented by: 


Sodium Chloride (Normal Saline -)  1,000 mls @ 100 mls/hr IV ASDIR [facility]


   Last Admin: 07/16/20 21:10 Dose:  100 mls/hr


   Documented by: 


Insulin Aspart (Novolog Vial Sliding Scale -)  1 vial SQ ACHS [facility]; Protocol


   Last Admin: 07/16/20 21:10 Dose:  4 units


   Documented by: 


Lisinopril (Prinivil)  20 mg PO DAILY [facility]


   Last Admin: 07/16/20 10:01 Dose:  20 mg


   Documented by: 


Pantoprazole Sodium (Protonix Iv)  40 mg IVPUSH BID [facility]


   Last Admin: 07/16/20 21:08 Dose:  40 mg


   Documented by: 











- Objective


Vital Signs: 


                                   Vital Signs











Temperature  99.1 F   07/17/20 02:00


 


Pulse Rate  56 L  07/17/20 02:00


 


Respiratory Rate  19   07/17/20 02:00


 


Blood Pressure  123/53 L  07/17/20 02:00


 


O2 Sat by Pulse Oximetry (%)  100   07/16/20 21:00











Constitutional: Yes: No Distress, Calm


Eyes: Yes: Conjunctiva Clear, EOM Intact


Neck: Yes: Supple, Trachea Midline


Cardiovascular: Yes: Regular Rate and Rhythm


Respiratory: Yes: CTA Bilaterally


Gastrointestinal: Yes: Soft (nt)


Edema: No


Peripheral Pulses WNL: Yes


Neurological: Yes: Alert, Oriented


Labs: 


                                    CBC, BMP





                                 07/16/20 06:45 





                                 07/16/20 06:45 





                                    INR, PTT











INR  1.11  (0.83-1.09)  H  07/10/20  22:23    








                                Laboratory Tests











  07/12/20 07/16/20 07/16/20





  08:30 06:45 23:00


 


WBC   


 


Hgb   11.0 


 


Plt Count   


 


Sodium   


 


Potassium   


 


BUN   


 


Creatinine   


 


Magnesium   


 


Gastric Occult Blood  Positive  


 


Stool Occult Blood    Negative














  07/17/20 07/17/20





  06:05 06:05


 


WBC  6.6 


 


Hgb  10.1 L 


 


Plt Count  210 


 


Sodium   140


 


Potassium   3.3 L


 


BUN   7.1


 


Creatinine   0.7


 


Magnesium   1.5 L


 


Gastric Occult Blood  


 


Stool Occult Blood  














- ....Imaging


EKG: Image Reviewed





Assessment/Plan





a/p:  65 f hx htn, dm, cad s/p pci 2008 (after +stress test, no MI), exlap for 

prior sbo, here with abd pain, vomiting, SBO, possible GIB:





Cad s/p remote pci, now with +trops/nstemi:


-pt has no cardiac sxs but trop mildly elevated, peaked at 1.8.  ECG unremar

kable.  Possible demand ischemia from acute issues but given cad hx possibly 

nstemi as well.  Started hep gtt 7/11 but then had coffee ground fluids in ngt 

so hep gtt stopped.   Cont statin, ace. ASA on hold.


-cont tele


-will need ischemic eval, most likely with nuclear stress test, when acute issue

s resolved


-Echo 7/13 normal EF, normal RVSP





htn:


- dc'd atenolol for bradycardia in the 40s


- cont current meds





hld:


-cont statin





sbo:


-surgery following, attempting conservative management for now, has been 

improving








HypoK, Mg2+:


-replete K+ , Mg2+ to 4, 2 respectively

## 2020-07-17 NOTE — PN
Teaching Attending Note


Name of Resident: Pranav Jones





ATTENDING PHYSICIAN STATEMENT





I saw and evaluated the patient.


I reviewed the resident's note and discussed the case with the resident.


I agree with the resident's findings and plan as documented.








SUBJECTIVE: Abdominal discomfort resolved. tolerating clears. No further 

nausea/vomiting. No fever/chills.








OBJECTIVE: Afebrile, Hemodnamically Stable. Comfortable.





                                Last Vital Signs











Temp Pulse Resp BP Pulse Ox


 


 98.7 F   57 L  20   143/64   97 % RA


 


 07/17/20 09:00  07/17/20 09:00  07/17/20 09:00  07/17/20 09:00  07/17/20 09:00











Heart - S1, S2, RRR


Lungs - clear to auscultation


Abdomen - Soft, non-tender. Bowel Sounds +.


Extremities - no edema, no calf tenderness.


Neuro - AAO x 3. Tone/Power normal all extremities.





                         Laboratory Results - last 24 hr











  07/16/20 07/17/20 07/17/20





  23:00 06:05 06:05


 


WBC   6.6 


 


RBC   3.81 


 


Hgb   10.1 L 


 


Hct   31.0 L 


 


MCV   81.3 


 


MCH   26.5 


 


MCHC   32.6 


 


RDW   13.7 


 


Plt Count   210 


 


MPV   8.4 


 


PTT (Actin FS)    29.9


 


Sodium   


 


Potassium   


 


Chloride   


 


Carbon Dioxide   


 


Anion Gap   


 


BUN   


 


Creatinine   


 


Est GFR (CKD-EPI)AfAm   


 


Est GFR (CKD-EPI)NonAf   


 


Random Glucose   


 


Calcium   


 


Phosphorus   


 


Magnesium   


 


Stool Occult Blood  Negative  














  07/17/20





  06:05


 


WBC 


 


RBC 


 


Hgb 


 


Hct 


 


MCV 


 


MCH 


 


MCHC 


 


RDW 


 


Plt Count 


 


MPV 


 


PTT (Actin FS) 


 


Sodium  140


 


Potassium  3.3 L


 


Chloride  108 H


 


Carbon Dioxide  26


 


Anion Gap  7 L


 


BUN  7.1


 


Creatinine  0.7


 


Est GFR (CKD-EPI)AfAm  103.91


 


Est GFR (CKD-EPI)NonAf  89.65


 


Random Glucose  131 H


 


Calcium  9.0


 


Phosphorus  2.5


 


Magnesium  1.5 L


 


Stool Occult Blood 





                               Current Medications











Generic Name Dose Route Start Last Admin





  Trade Name Freq  PRN Reason Stop Dose Admin


 


Acetaminophen  650 mg  07/11/20 05:14  07/14/20 19:02





  Tylenol -  PO   650 mg





  Q4H PRN   Administration





  PAIN LEVEL 4 - 6  


 


Amlodipine Besylate  10 mg  07/11/20 10:00  07/17/20 09:39





  Norvasc -  PO   10 mg





  DAILY JULIA   Administration


 


Aspirin  81 mg  07/11/20 10:00  07/14/20 09:50





  Ecotrin -  PO   Not Given





  DAILY JULIA  


 


Atorvastatin Calcium  40 mg  07/11/20 22:00  07/16/20 21:08





  Lipitor -  PO   40 mg





  HS JULIA   Administration


 


Benzocaine/Menthol  1 each  07/13/20 16:21  07/16/20 13:43





  Cepacol Lozenge -  MM   1 each





  PRN PRN   Administration





  SORE THROAT  


 


Cholecalciferol  1,000 unit  07/11/20 10:00  07/17/20 09:40





  Vitamin D3 -  PO   1,000 unit





  DAILY JULIA   Administration


 


Cyanocobalamin  100 mcg  07/11/20 10:00  07/17/20 09:40





  Vitamin B12 -  PO   100 mcg





  DAILY JULIA   Administration


 


Hydralazine HCl  10 mg  07/14/20 15:00  07/14/20 15:33





  Apresoline Injection -  IVPB   10 mg





  Q8H PRN   Administration





  HYPERTENSION  


 


Sodium Chloride  1,000 mls @ 100 mls/hr  07/14/20 08:00  07/17/20 08:17





  Normal Saline -  IV   100 mls/hr





  ASDIR JULIA   Administration


 


Insulin Aspart  1 vial  07/11/20 07:00  07/17/20 12:42





  Novolog Vial Sliding Scale -  SQ   6 units





  ACHS JULIA   Administration





  Protocol  


 


Lisinopril  20 mg  07/11/20 10:00  07/17/20 09:39





  Prinivil  PO   20 mg





  DAILY JULIA   Administration


 


Pantoprazole Sodium  40 mg  07/12/20 22:00  07/17/20 09:40





  Protonix Iv  IVPUSH   40 mg





  BID JULIA   Administration








                                Home Medications











 Medication  Instructions  Recorded


 


Aspirin [Aspirin EC] 325 mg PO DAILY 10/22/15


 


Atenolol [Tenormin] 25 mg PO BID 10/22/15


 


Lisinopril [Prinivil -] 20 mg PO DAILY 10/22/15


 


Metformin HCl [Glucophage] 500 mg PO BID 10/22/15


 


Amlodipine Besylate [Norvasc -] 10 mg PO DAILY 01/30/20


 


Atorvastatin Calcium [Lipitor] 40 mg PO HS 01/30/20


 


Ergocalciferol (Vitamin D2) 1,000 unit PO DAILY 01/30/20





[Vitamin D2]  


 


Vitamin B12 100 mg PO DAILY 01/30/20


 


Glimepiride 1 mg PO DAILY 07/11/20


 


Naproxen 500 mg PO Q12H PRN 07/11/20











                                        


                                        


                                        





ASSESSMENT AND PLAN:





65 year old female with history of HTN, DM 2, CAD s/p Stents 2008, history of 

multiple episodes of SBO, presents with SBO and found to have acute NSTEMI. 





1. Small Bowel Obstruction, recurrent


Tolerating clears s/p NGT removal.


Advance diet as tolerated as per Sx.


Surgery following. 


Discontinue IV fluids.





2. Acute NSTEMI


Likely secondary to demand, TropI max 1.84


Seen by Cardio and started on Heparin drip, eventually stopped due to coffee 

ground blood loss in NGT.


Cardiology following.


On Lipitor/ACE-I. Atenolol stopped due to Bradycardia.


Ischemic work-up after resolution of current acute illness as per Cardio.





3. Acute Blood Loss anemia sec to coffee ground emesis ?sec to stress gastritis


Gastric Occult Blood +


Heparin drip stopped.


H/H stable - monitor.


Protonix BID.


Will consult GI for recommendations regarding further work-up for coffee ground 

emesis given now resolving SBO.





4. Proteinuria and Hematuria


Renal Ultrasound - bilateral renal cortical scarring.


Urology out-patient follow-up





5. HTN - Continue Norvasc, Lisinopril. Atenolol stopped due to Bradycardia. 





6. Hypomagnesemia/Hypokalemia - recurrent, repleted.





7. DM 2 - Glimepiride held. Maintain on Novolog sliding scale.











DVT Px - SCDs. Heparin held due to coffee ground emesis.

## 2020-07-18 VITALS — DIASTOLIC BLOOD PRESSURE: 74 MMHG | HEART RATE: 60 BPM | TEMPERATURE: 98 F | SYSTOLIC BLOOD PRESSURE: 136 MMHG

## 2020-07-18 LAB
ANION GAP SERPL CALC-SCNC: 8 MMOL/L (ref 8–16)
BUN SERPL-MCNC: 3.9 MG/DL (ref 7–18)
CALCIUM SERPL-MCNC: 10.3 MG/DL (ref 8.5–10.1)
CHLORIDE SERPL-SCNC: 105 MMOL/L (ref 98–107)
CO2 SERPL-SCNC: 26 MMOL/L (ref 21–32)
CREAT SERPL-MCNC: 0.8 MG/DL (ref 0.55–1.3)
DEPRECATED RDW RBC AUTO: 13.8 % (ref 11.6–15.6)
GLUCOSE SERPL-MCNC: 213 MG/DL (ref 74–106)
HCT VFR BLD CALC: 35.1 % (ref 32.4–45.2)
HGB BLD-MCNC: 11.4 GM/DL (ref 10.7–15.3)
MAGNESIUM SERPL-MCNC: 1.7 MG/DL (ref 1.8–2.4)
MCH RBC QN AUTO: 26.8 PG (ref 25.7–33.7)
MCHC RBC AUTO-ENTMCNC: 32.5 G/DL (ref 32–36)
MCV RBC: 82.5 FL (ref 80–96)
PHOSPHATE SERPL-MCNC: 2.6 MG/DL (ref 2.5–4.9)
PLATELET # BLD AUTO: 267 K/MM3 (ref 134–434)
PMV BLD: 8.8 FL (ref 7.5–11.1)
POTASSIUM SERPLBLD-SCNC: 4.2 MMOL/L (ref 3.5–5.1)
RBC # BLD AUTO: 4.26 M/MM3 (ref 3.6–5.2)
SODIUM SERPL-SCNC: 139 MMOL/L (ref 136–145)
WBC # BLD AUTO: 6.4 K/MM3 (ref 4–10)

## 2020-07-18 RX ADMIN — PANTOPRAZOLE SODIUM SCH MG: 40 INJECTION, POWDER, FOR SOLUTION INTRAVENOUS at 10:28

## 2020-07-18 RX ADMIN — VITAMIN D, TAB 1000IU (100/BT) SCH UNIT: 25 TAB at 10:28

## 2020-07-18 RX ADMIN — VITAM B12 SCH MCG: 100 TAB at 10:28

## 2020-07-18 RX ADMIN — INSULIN ASPART SCH UNITS: 100 INJECTION, SOLUTION INTRAVENOUS; SUBCUTANEOUS at 17:01

## 2020-07-18 RX ADMIN — AMLODIPINE BESYLATE SCH MG: 10 TABLET ORAL at 10:28

## 2020-07-18 RX ADMIN — LISINOPRIL SCH MG: 20 TABLET ORAL at 10:28

## 2020-07-18 RX ADMIN — INSULIN ASPART SCH: 100 INJECTION, SOLUTION INTRAVENOUS; SUBCUTANEOUS at 06:07

## 2020-07-18 RX ADMIN — INSULIN ASPART SCH UNITS: 100 INJECTION, SOLUTION INTRAVENOUS; SUBCUTANEOUS at 11:48

## 2020-07-18 NOTE — DS
Physical Exam: 


SUBJECTIVE: 


Patient seen and examined at bedside.  The patient denies fever/chills, 

shortness of breath, chest pain, diarrhea, and constipation.








OBJECTIVE:





                                   Vital Signs











 Period  Temp  Pulse  Resp  BP Sys/Vasquez  Pulse Ox


 


 Last 24 Hr  98.2 F-98.7 F  51-62  16-20  132-148/58-71  98-99








PHYSICAL EXAM





GENERAL: The patient is awake, alert, and fully oriented, in no acute distress.


HEAD: Normal with no signs of trauma.


EYES: Extraocular movements intact. 


ENT: Ears normal, nares patent, oropharynx clear without exudates, moist mucous 

membranes.


NECK: Trachea midline, full range of motion, supple. 


LUNGS: Breath sounds equal, clear to auscultation bilaterally, no wheezes, no 

crackles, no accessory muscle use. 


HEART: Regular rate and rhythm, S1, S2 without murmur, rub or gallop.


ABDOMEN: Soft, nontender, nondistended, normoactive bowel sounds, no guarding, 

no rebound, no masses.


EXTREMITIES: 2+ pulses, warm, well-perfused, no edema. 


NEUROLOGICAL: Normal speech, gait not observed.


PSYCH: Normal mood, normal affect.


SKIN: Warm, dry, normal turgor, no rashes or lesions noted.





LABS


                         Laboratory Results - last 24 hr











  07/18/20 07/18/20





  08:50 08:50


 


WBC   6.4


 


RBC   4.26


 


Hgb   11.4


 


Hct   35.1


 


MCV   82.5


 


MCH   26.8


 


MCHC   32.5


 


RDW   13.8


 


Plt Count   267  D


 


MPV   8.8


 


Sodium  139 


 


Potassium  4.2 


 


Chloride  105 


 


Carbon Dioxide  26 


 


Anion Gap  8 


 


BUN  3.9 L 


 


Creatinine  0.8 


 


Est GFR (CKD-EPI)AfAm  88.42 


 


Est GFR (CKD-EPI)NonAf  76.29 


 


Random Glucose  213 H 


 


Calcium  10.3 H 


 


Phosphorus  2.6 


 


Magnesium  1.7 L 











HOSPITAL COURSE:





Date of Admission:07/11/20





65 year old female patient with past medical history that includes HTN, DM, CAD 

s/p stents, and multiple episodes of SBOs who presented to the ED with nausea, 

vomiting, and abdominal pain.  An abdominal x-ray showed a small bowel 

obstruction, and the patient began conservative management with an NG tube.  The

patient was also found to have tropenemia with a Troponin max of 1.84.  Protein 

and blood was found on a urinalysis, and a renal ultrasound then found bilateral

renal cortical scarring.  During the patient's hospital stay, the patient 

vomited coffee ground emesis with a positive gastric occult blood test but a 

negative stool occult blood test.  The patient received serial abdominal xrays, 

which showed improvement and finally resolution of the SBO using conservative 

management.  The patient was able to have bowel movements again.  For discharge,

GI recommended PPI for stress gastritis prophylaxis, and the patient was told to

follow up with Cardiology for an outpatient stress test due to her tropenemia 

and to follow up with Urology due to the bilateral cortical scarring found on 

renal ultrasound.





Date of Discharge: 07/18/20











Minutes to complete discharge: 48





Discharge Summary


Problems reviewed: Yes


Reason For Visit: NON-ST ELEVATION MYOCARDIAL INFARCTION (NSTEMI)


Current Active Problems





Diabetes mellitus (Chronic)


Hyperlipidemia (Chronic)


Hypertension (Chronic)








Condition: Good





- Instructions


Diet, Activity, Other Instructions: 


You were admitted to the hospital for abdominal pain, nausea and vomiting.  We 

evaluated you with blood work, lab work, and imaging.  Based on our evaluation 

we found you had a small bowel obstruction which required medication, and fluid 

to help resolve. We had our surgeons evaluate you and they recommended no 

surgical intervention and just conservative management for you, which helped 

resolve your symptoms. 





While you were here we also found that you had elevated cardiac enzymes.  This 

requires a stress test to be performed for further evaluation. However, after 

discussion with your cardiologist, he recommended that you can receive this 

stress test outpatient. Please follow up with the cardiologist outpatient for a 

stress test of your heart.  





We also found some blood and protein in your urine during your hospital stay, 

which is not of concern at the moment but will require outpatient follow up with

your urologist.  An ultrasound of your kidneys also showed scarring.  Please 

follow up with a urologist regarding these findings.





While you were here, we also noticed some abnormalities in the color of your 

vomit, which will need to be followed up with the gastroenterologist outpatient.





Medications: 


We made some changes to your medications, please start taking these medications 

as prescribed





Please STOP taking Aspirin 325mg


Please STOP taking your Atenolol because your heart rate was too low.


Please STOP taking your Naproxen to avoid hurting your your stomach.








INSTEAD: Please START taking Aspirin 81mg once daily





Please START taking Pantoprazole 40mg once daily





Please continue all of your medications as prescribed.





Follow ups


Please follow up with the cardiologist Dr. Spike Batista regarding your elevated 

cardiac enzymes. You will need to undergo a stress test outpatient, please 

follow up with your cardiologist for this stress test.


Please follow up with the urologist Dr. Jeovany Cameron regarding the 

abnormalities found in your imaging. 


Please follow up with the surgeon Dr. Pedro Luis Avalos regarding your small bowel 

obstruction.


Please follow up with the gastroenterologist Dr. José Busch regarding dark 

stained vomit found during your hospital stay.


Please follow up with the primary care physician Dr. Mariana Catalan.


If you experience worsening of your symptoms, chest pain, difficulty breathing, 

abdominal pain, or worsening of your condition, please come back to the 

emergency room.





Referrals: 


Pedro Luis Avalos MD [Staff Physician] - 


Jeovany Cameron MD [Staff Physician] - 2 Weeks (Hematuria, Proteinuria, renal

cortical scarring)


Spike Batista MD [Staff Physician] - 1 Week (Demand iscemia during admission for 

SBO, needs stress test.)


José Busch MD [Staff Physician] - 2 Weeks (Coffee ground emesis due to 

stress gastirtis)


Mariana Catalan MD [Primary Care Provider] - 1 Week


Disposition: VNS/HOME HEALTH CARE





- Home Medications


Comprehensive Discharge Medication List: 


Ambulatory Orders





Lisinopril [Prinivil -] 20 mg PO DAILY 10/22/15 


Metformin HCl [Glucophage] 500 mg PO BID 10/22/15 


Amlodipine Besylate [Norvasc -] 10 mg PO DAILY 01/30/20 


Atorvastatin Calcium [Lipitor] 40 mg PO HS 01/30/20 


Ergocalciferol (Vitamin D2) [Vitamin D2] 1,000 unit PO DAILY 01/30/20 


Vitamin B12 100 mg PO DAILY 01/30/20 


Glimepiride 1 mg PO DAILY 07/11/20 


Aspirin Coated [Ecotrin -] 81 mg PO DAILY 30 Days #30 tablet.ec 07/18/20 


Pantoprazole Sodium [Protonix] 40 mg PO DAILY 30 Days #30 tablet. 07/18/20 








This patient is new to me today: No


Emergency Visit: Yes


ED Registration Date: 07/11/20


Care time: The patient presented to the Emergency Department on the above date 

and was hospitalized for further evaluation of their emergent condition.


Critical Care patient: No





- Discharge Referral


Referred to [facility] No





ATTENDING PHYSICIAN STATEMENT





I saw and evaluated the patient.


I reviewed the resident's note and discussed the case with the resident.


I agree with the resident's findings and plan as documented.








SUBJECTIVE:








OBJECTIVE:








ASSESSMENT AND PLAN:

## 2020-07-18 NOTE — PN
Progress Note, Physician


Chief Complaint: 





NSTEMI


History of Present Illness: 





denies cp, sob, orthopnea--currently or at home/earlier this hosp stay.


very active at home without sx's


no more abd pain


no palp, swelling





no cigs





- Current Medication List


Current Medications: 


Active Medications





Acetaminophen (Tylenol -)  650 mg PO Q4H PRN


   PRN Reason: PAIN LEVEL 4 - 6


   Last Admin: 07/14/20 19:02 Dose:  650 mg


   Documented by: 


Amlodipine Besylate (Norvasc -)  10 mg PO DAILY [facility]


   Last Admin: 07/17/20 09:39 Dose:  10 mg


   Documented by: 


Aspirin (Ecotrin -)  81 mg PO DAILY [facility]


   Last Admin: 07/14/20 09:50 Dose:  Not Given


   Documented by: 


Atorvastatin Calcium (Lipitor -)  40 mg PO HS [facility]


   Last Admin: 07/17/20 21:26 Dose:  40 mg


   Documented by: 


Benzocaine/Menthol (Cepacol Lozenge -)  1 each MM PRN PRN


   PRN Reason: SORE THROAT


   Last Admin: 07/16/20 13:43 Dose:  1 each


   Documented by: 


Cholecalciferol (Vitamin D3 -)  1,000 unit PO DAILY [facility]


   Last Admin: 07/17/20 09:40 Dose:  1,000 unit


   Documented by: 


Cyanocobalamin (Vitamin B12 -)  100 mcg PO DAILY [facility]


   Last Admin: 07/17/20 09:40 Dose:  100 mcg


   Documented by: 


Hydralazine HCl (Apresoline Injection -)  10 mg IVPB Q8H PRN


   PRN Reason: HYPERTENSION


   Last Admin: 07/14/20 15:33 Dose:  10 mg


   Documented by: 


Insulin Aspart (Novolog Vial Sliding Scale -)  1 vial SQ [facility]; Protocol


   Last Admin: 07/18/20 06:07 Dose:  Not Given


   Documented by: 


Lisinopril (Prinivil)  20 mg PO DAILY [facility]


   Last Admin: 07/17/20 09:39 Dose:  20 mg


   Documented by: 


Pantoprazole Sodium (Protonix Iv)  40 mg IVPUSH BID [facility]


   Last Admin: 07/17/20 21:26 Dose:  40 mg


   Documented by: 











- Objective


Vital Signs: 


                                   Vital Signs











Temperature  98.2 F   07/18/20 06:00


 


Pulse Rate  62   07/18/20 06:00


 


Respiratory Rate  16   07/18/20 06:00


 


Blood Pressure  132/61   07/18/20 06:00


 


O2 Sat by Pulse Oximetry (%)  98   07/18/20 06:00











Constitutional: Yes: No Distress, Calm


Eyes: No: Sclera Icterus


HENT: No: Nasal Congestion


Cardiovascular: Yes: Regular Rate and Rhythm, S1, S2, Other (PMI non diplaced). 

No: JVD, Gallop, Murmur


Respiratory: Yes: CTA Bilaterally.  No: Accessory Muscle Use, Rales, Wheezes


Gastrointestinal: Yes: Normal Bowel Sounds, Soft.  No: Tenderness


Musculoskeletal: Yes: Other (No kyphosis)


Extremities: No: Cold, Cyanosis


Edema: No


Integumentary: No: Jaundice


Neurological: Yes: Alert, Oriented (x3)


Psychiatric: No: Agitated


Labs: 


                                    CBC, BMP





                                 07/17/20 06:05 





                                 07/17/20 06:05 





                                    INR, PTT











INR  1.11  (0.83-1.09)  H  07/10/20  22:23    














Assessment/Plan





Echo: nl LV/RV. mild AI





tele: NSR














a/p:  65 f hx htn, dm, cad s/p pci 2008 (after +stress test, no MI), exlap for 

prior sbo, here with abd pain, vomiting, SBO, possible GIB:





CAD s/p remote pci, NSTEMI here (Type I vs Type II):


-details of prior cath not available, follows closely with Dr Curran at [facility]


-pt has no cardiac sxs but trop mildly elevated, peaked at 1.8.  ECG 

unremarkable.  Possible demand ischemia from acute issues but given cad hx 

possibly nstemi as well.  


-resume aspirin (no suspicion of GIB per GI)


-cont statin, ACE


-no BB (sinus naty here)


-cont tele


-for ischemia evaluation with nuclear stress test. pt feels well and wishes to 

go home. given clinically stable with no ischemic sx's at any time, on good 

meds, she can arrange for stress test next week with her cardiologist. 

verbalizes understanding of its importance and agrees to do so by next week.





recurrent SBO:


-GI input appreciated: SBO clinically resolving


-no signs of GIB, stable H/H (brownish discoloration in NGT output is sec to 

stagnant small bowel contents)





htn:


- dc'd atenolol for bradycardia in the 40s


- cont current meds





hld:


-cont statin





HypoK, Mg2+:


-replete K+ , Mg2+ to 4, 2 respectively

## 2020-07-18 NOTE — PN
Teaching Attending Note


Name of Resident: Pranav Jones





ATTENDING PHYSICIAN STATEMENT





I saw and evaluated the patient.


I reviewed the resident's note and discussed the case with the resident.


I agree with the resident's findings and plan as documented.








SUBJECTIVE: Abdominal discomfort resolved. Tolerating full diet. No further 

nausea/vomiting. No fever/chills.








OBJECTIVE: Afebrile, Hemodnamically Stable. Comfortable.





                                Last Vital Signs











Temp Pulse Resp BP Pulse Ox


 


 98.4 F   59 L  18   148/71   98 


 


 07/18/20 10:00 07/18/20 10:00 07/18/20 10:00 07/18/20 10:00 07/18/20 13:13











Heart - S1, S2, RRR


Lungs - clear to auscultation


Abdomen - Soft, non-tender. Bowel Sounds +.


Extremities - no edema, no calf tenderness.


Neuro - AAO x 3. Tone/Power normal all extremities.





                         Laboratory Results - last 24 hr











  07/17/20 07/18/20 07/18/20





  16:25 08:50 08:50


 


WBC    6.4


 


RBC    4.26


 


Hgb    11.4


 


Hct    35.1


 


MCV    82.5


 


MCH    26.8


 


MCHC    32.5


 


RDW    13.8


 


Plt Count    267  D


 


MPV    8.8


 


Sodium   139 


 


Potassium   4.2 


 


Chloride   105 


 


Carbon Dioxide   26 


 


Anion Gap   8 


 


BUN   3.9 L 


 


Creatinine   0.8 


 


Est GFR (CKD-EPI)AfAm   88.42 


 


Est GFR (CKD-EPI)NonAf   76.29 


 


POC Glucometer  170  


 


Random Glucose   213 H 


 


Calcium   10.3 H 


 


Phosphorus   2.6 


 


Magnesium   1.7 L 








                               Current Medications











Generic Name Dose Route Start Last Admin





  Trade Name Harjinder  PRN Reason Stop Dose Admin


 


Acetaminophen  650 mg  07/11/20 05:14  07/14/20 19:02





  Tylenol -  PO   650 mg





  Q4H PRN   Administration





  PAIN LEVEL 4 - 6  


 


Amlodipine Besylate  10 mg  07/11/20 10:00  07/18/20 10:28





  Norvasc -  PO   10 mg





  DAILY JULIA   Administration


 


Aspirin  81 mg  07/18/20 10:00  07/18/20 10:27





  Ecotrin -  PO   81 mg





  DAILY JULIA   Administration


 


Atorvastatin Calcium  40 mg  07/11/20 22:00  07/17/20 21:26





  Lipitor -  PO   40 mg





  HS JULIA   Administration


 


Benzocaine/Menthol  1 each  07/13/20 16:21  07/16/20 13:43





  Cepacol Lozenge -  MM   1 each





  PRN PRN   Administration





  SORE THROAT  


 


Cholecalciferol  1,000 unit  07/11/20 10:00  07/18/20 10:28





  Vitamin D3 -  PO   1,000 unit





  DAILY JULIA   Administration


 


Cyanocobalamin  100 mcg  07/11/20 10:00  07/18/20 10:28





  Vitamin B12 -  PO   100 mcg





  DAILY JULIA   Administration


 


Hydralazine HCl  10 mg  07/14/20 15:00  07/14/20 15:33





  Apresoline Injection -  IVPB   10 mg





  Q8H PRN   Administration





  HYPERTENSION  


 


Insulin Aspart  1 vial  07/11/20 07:00  07/18/20 11:48





  Novolog Vial Sliding Scale -  SQ   2 units





  ACHS JULIA   Administration





  Protocol  


 


Lisinopril  20 mg  07/11/20 10:00  07/18/20 10:28





  Prinivil  PO   20 mg





  DAILY JULIA   Administration


 


Pantoprazole Sodium  40 mg  07/12/20 22:00  07/18/20 10:28





  Protonix Iv  IVPUSH   40 mg





  BID JULIA   Administration








                                Home Medications











 Medication  Instructions  Recorded


 


Aspirin [Aspirin EC] 325 mg PO DAILY 10/22/15


 


Atenolol [Tenormin] 25 mg PO BID 10/22/15


 


Lisinopril [Prinivil -] 20 mg PO DAILY 10/22/15


 


Metformin HCl [Glucophage] 500 mg PO BID 10/22/15


 


Amlodipine Besylate [Norvasc -] 10 mg PO DAILY 01/30/20


 


Atorvastatin Calcium [Lipitor] 40 mg PO HS 01/30/20


 


Ergocalciferol (Vitamin D2) 1,000 unit PO DAILY 01/30/20





[Vitamin D2]  


 


Vitamin B12 100 mg PO DAILY 01/30/20


 


Glimepiride 1 mg PO DAILY 07/11/20


 


Naproxen 500 mg PO Q12H PRN 07/11/20














                                        





ASSESSMENT AND PLAN:





65 year old female with history of HTN, DM 2, CAD s/p Stents 2008, history of 

multiple episodes of SBO, presents with SBO and found to have acute NSTEMI. 





1. Small Bowel Obstruction, recurrent - resolved


Tolerating full diet s/p NGT removal - advance to soft diet.


Surgery following - for out-patient follow up.





2. Acute NSTEMI


Likely secondary to demand, TropI max 1.84


Seen by Cardio and started on Heparin drip, eventually stopped due to coffee 

ground blood loss in NGT.


Cardiology recommends out-patient stress test. Discussed with Dr. Batista - no 

objection to discharge from a cardiology perspective 


On Lipitor/ACE-I. Atenolol stopped due to Bradycardia.


Ischemic work-up as out-patient including Stress Test.





3. Acute Blood Loss anemia sec to coffee ground emesis ?sec to stress gastritis


Gastric Occult Blood +, FOBT negative.


Heparin drip stopped.


H/H stable 


Cleared for discharge from GI perspective on Protonix.





4. Proteinuria and Hematuria


Renal Ultrasound - bilateral renal cortical scarring.


Urology out-patient follow-up





5. HTN - Continue Norvasc, Lisinopril. Atenolol stopped due to Bradycardia. 





6. Hypomagnesemia/Hypokalemia - recurrent, repleted.





7. DM 2 - Resume Glimepiride on discharge. 








Improved. Medically Stable for discharge on Soft diet with Cardiology, Urology, 

and Surgery follow ups.

## 2022-02-10 ENCOUNTER — HOSPITAL ENCOUNTER (INPATIENT)
Dept: HOSPITAL 74 - JER | Age: 67
LOS: 6 days | Discharge: HOME | DRG: 389 | End: 2022-02-16
Attending: INTERNAL MEDICINE | Admitting: INTERNAL MEDICINE
Payer: COMMERCIAL

## 2022-02-10 VITALS — BODY MASS INDEX: 27.1 KG/M2

## 2022-02-10 DIAGNOSIS — I12.9: ICD-10-CM

## 2022-02-10 DIAGNOSIS — E11.40: ICD-10-CM

## 2022-02-10 DIAGNOSIS — K56.609: Primary | ICD-10-CM

## 2022-02-10 DIAGNOSIS — N17.9: ICD-10-CM

## 2022-02-10 DIAGNOSIS — E11.65: ICD-10-CM

## 2022-02-10 DIAGNOSIS — E87.1: ICD-10-CM

## 2022-02-10 DIAGNOSIS — Z79.84: ICD-10-CM

## 2022-02-10 DIAGNOSIS — E86.1: ICD-10-CM

## 2022-02-10 DIAGNOSIS — N20.0: ICD-10-CM

## 2022-02-10 DIAGNOSIS — N18.9: ICD-10-CM

## 2022-02-10 DIAGNOSIS — E11.22: ICD-10-CM

## 2022-02-10 LAB
ALBUMIN SERPL-MCNC: 4 G/DL (ref 3.4–5)
ALBUMIN SERPL-MCNC: 4.3 G/DL (ref 3.4–5)
ALP SERPL-CCNC: 59 U/L (ref 45–117)
ALP SERPL-CCNC: 63 U/L (ref 45–117)
ALT SERPL-CCNC: 21 U/L (ref 13–61)
ALT SERPL-CCNC: 23 U/L (ref 13–61)
ANION GAP SERPL CALC-SCNC: 10 MMOL/L (ref 8–16)
ANION GAP SERPL CALC-SCNC: 10 MMOL/L (ref 8–16)
APPEARANCE UR: CLEAR
AST SERPL-CCNC: 17 U/L (ref 15–37)
AST SERPL-CCNC: 20 U/L (ref 15–37)
BACTERIA # UR AUTO: 11 /UL (ref 0–1359)
BASOPHILS # BLD: 0.4 % (ref 0–2)
BILIRUB SERPL-MCNC: 0.4 MG/DL (ref 0.2–1)
BILIRUB SERPL-MCNC: 0.5 MG/DL (ref 0.2–1)
BILIRUB UR STRIP.AUTO-MCNC: NEGATIVE MG/DL
BUN SERPL-MCNC: 27.6 MG/DL (ref 7–18)
BUN SERPL-MCNC: 29.5 MG/DL (ref 7–18)
CALCIUM SERPL-MCNC: 10.4 MG/DL (ref 8.5–10.1)
CALCIUM SERPL-MCNC: 9.6 MG/DL (ref 8.5–10.1)
CASTS URNS QL MICRO: 3 /UL (ref 0–3.1)
CHLORIDE SERPL-SCNC: 89 MMOL/L (ref 98–107)
CHLORIDE SERPL-SCNC: 94 MMOL/L (ref 98–107)
CO2 SERPL-SCNC: 24 MMOL/L (ref 21–32)
CO2 SERPL-SCNC: 27 MMOL/L (ref 21–32)
COLOR UR: YELLOW
CREAT SERPL-MCNC: 1.1 MG/DL (ref 0.55–1.3)
CREAT SERPL-MCNC: 1.4 MG/DL (ref 0.55–1.3)
DEPRECATED RDW RBC AUTO: 15.3 % (ref 11.6–15.6)
EOSINOPHIL # BLD: 0 % (ref 0–4.5)
EPITH CASTS URNS QL MICRO: 19 /UL (ref 0–25.1)
GLUCOSE SERPL-MCNC: 181 MG/DL (ref 74–106)
GLUCOSE SERPL-MCNC: 198 MG/DL (ref 74–106)
HCT VFR BLD CALC: 39.7 % (ref 32.4–45.2)
HGB BLD-MCNC: 12.8 GM/DL (ref 10.7–15.3)
KETONES UR QL STRIP: NEGATIVE
LACTATE SERPL-MCNC: 4.8 MMOL/L (ref 0.4–2)
LEUKOCYTE ESTERASE UR QL STRIP.AUTO: NEGATIVE
LIPASE SERPL-CCNC: 161 U/L (ref 73–393)
LYMPHOCYTES # BLD: 32.8 % (ref 8–40)
MCH RBC QN AUTO: 24.4 PG (ref 25.7–33.7)
MCHC RBC AUTO-ENTMCNC: 32.4 G/DL (ref 32–36)
MCV RBC: 75.3 FL (ref 80–96)
MONOCYTES # BLD AUTO: 9.6 % (ref 3.8–10.2)
NEUTROPHILS # BLD: 57.2 % (ref 42.8–82.8)
NITRITE UR QL STRIP: NEGATIVE
PH UR: 6 [PH] (ref 5–8)
PLATELET # BLD AUTO: 275 10^3/UL (ref 134–434)
PMV BLD: 9 FL (ref 7.5–11.1)
PROT SERPL-MCNC: 7.7 G/DL (ref 6.4–8.2)
PROT SERPL-MCNC: 8.1 G/DL (ref 6.4–8.2)
PROT UR QL STRIP: (no result)
PROT UR QL STRIP: (no result)
RBC # BLD AUTO: 5.27 M/MM3 (ref 3.6–5.2)
RBC # BLD AUTO: 78 /UL (ref 0–23.9)
SODIUM SERPL-SCNC: 126 MMOL/L (ref 136–145)
SODIUM SERPL-SCNC: 129 MMOL/L (ref 136–145)
SP GR UR: 1.02 (ref 1.01–1.03)
UROBILINOGEN UR STRIP-MCNC: 0.2 MG/DL (ref 0.2–1)
WBC # BLD AUTO: 6.6 K/MM3 (ref 4–10)
WBC # UR AUTO: 9 /UL (ref 0–25.8)

## 2022-02-10 PROCEDURE — U0003 INFECTIOUS AGENT DETECTION BY NUCLEIC ACID (DNA OR RNA); SEVERE ACUTE RESPIRATORY SYNDROME CORONAVIRUS 2 (SARS-COV-2) (CORONAVIRUS DISEASE [COVID-19]), AMPLIFIED PROBE TECHNIQUE, MAKING USE OF HIGH THROUGHPUT TECHNOLOGIES AS DESCRIBED BY CMS-2020-01-R: HCPCS

## 2022-02-10 PROCEDURE — C9803 HOPD COVID-19 SPEC COLLECT: HCPCS

## 2022-02-10 PROCEDURE — U0005 INFEC AGEN DETEC AMPLI PROBE: HCPCS

## 2022-02-10 PROCEDURE — 0D9670Z DRAINAGE OF STOMACH WITH DRAINAGE DEVICE, VIA NATURAL OR ARTIFICIAL OPENING: ICD-10-PCS | Performed by: INTERNAL MEDICINE

## 2022-02-11 LAB
ALBUMIN SERPL-MCNC: 3.9 G/DL (ref 3.4–5)
ALP SERPL-CCNC: 56 U/L (ref 45–117)
ALT SERPL-CCNC: 18 U/L (ref 13–61)
ANION GAP SERPL CALC-SCNC: 9 MMOL/L (ref 8–16)
APPEARANCE UR: CLEAR
AST SERPL-CCNC: 11 U/L (ref 15–37)
BACTERIA # UR AUTO: 7 /UL (ref 0–1359)
BASOPHILS # BLD: 0.4 % (ref 0–2)
BILIRUB SERPL-MCNC: 0.5 MG/DL (ref 0.2–1)
BILIRUB UR STRIP.AUTO-MCNC: NEGATIVE MG/DL
BUN SERPL-MCNC: 27.5 MG/DL (ref 7–18)
CALCIUM SERPL-MCNC: 9.6 MG/DL (ref 8.5–10.1)
CASTS URNS QL MICRO: 1 /UL (ref 0–3.1)
CHLORIDE SERPL-SCNC: 97 MMOL/L (ref 98–107)
CO2 SERPL-SCNC: 26 MMOL/L (ref 21–32)
COLOR UR: YELLOW
CREAT SERPL-MCNC: 1 MG/DL (ref 0.55–1.3)
DEPRECATED RDW RBC AUTO: 14.9 % (ref 11.6–15.6)
EOSINOPHIL # BLD: 0 % (ref 0–4.5)
EPITH CASTS URNS QL MICRO: 8 /UL (ref 0–25.1)
GLUCOSE SERPL-MCNC: 197 MG/DL (ref 74–106)
HCT VFR BLD CALC: 36.9 % (ref 32.4–45.2)
HGB BLD-MCNC: 12.1 GM/DL (ref 10.7–15.3)
KETONES UR QL STRIP: (no result)
LEUKOCYTE ESTERASE UR QL STRIP.AUTO: NEGATIVE
LYMPHOCYTES # BLD: 20.8 % (ref 8–40)
MAGNESIUM SERPL-MCNC: 2.1 MG/DL (ref 1.8–2.4)
MCH RBC QN AUTO: 24.6 PG (ref 25.7–33.7)
MCHC RBC AUTO-ENTMCNC: 32.8 G/DL (ref 32–36)
MCV RBC: 74.9 FL (ref 80–96)
MONOCYTES # BLD AUTO: 12.8 % (ref 3.8–10.2)
NEUTROPHILS # BLD: 66 % (ref 42.8–82.8)
NITRITE UR QL STRIP: NEGATIVE
PH UR: 5 [PH] (ref 5–8)
PHOSPHATE SERPL-MCNC: 3.1 MG/DL (ref 2.5–4.9)
PLATELET # BLD AUTO: 225 10^3/UL (ref 134–434)
PMV BLD: 8.5 FL (ref 7.5–11.1)
PROT SERPL-MCNC: 7.3 G/DL (ref 6.4–8.2)
PROT UR QL STRIP: (no result)
PROT UR QL STRIP: NEGATIVE
RBC # BLD AUTO: 29 /UL (ref 0–23.9)
RBC # BLD AUTO: 4.92 M/MM3 (ref 3.6–5.2)
SODIUM SERPL-SCNC: 132 MMOL/L (ref 136–145)
SP GR UR: 1.05 (ref 1.01–1.03)
UROBILINOGEN UR STRIP-MCNC: 0.2 MG/DL (ref 0.2–1)
WBC # BLD AUTO: 6.6 K/MM3 (ref 4–10)
WBC # UR AUTO: 13 /UL (ref 0–25.8)

## 2022-02-11 RX ADMIN — ENALAPRILAT SCH MG: 1.25 INJECTION INTRAVENOUS at 12:26

## 2022-02-11 RX ADMIN — ENALAPRILAT SCH MG: 1.25 INJECTION INTRAVENOUS at 17:39

## 2022-02-11 RX ADMIN — ENOXAPARIN SODIUM SCH MG: 40 INJECTION SUBCUTANEOUS at 09:40

## 2022-02-11 RX ADMIN — SODIUM CHLORIDE SCH MLS/HR: 9 INJECTION, SOLUTION INTRAVENOUS at 00:24

## 2022-02-12 LAB
ALBUMIN SERPL-MCNC: 3.6 G/DL (ref 3.4–5)
ALP SERPL-CCNC: 52 U/L (ref 45–117)
ALT SERPL-CCNC: 16 U/L (ref 13–61)
ANION GAP SERPL CALC-SCNC: 6 MMOL/L (ref 8–16)
AST SERPL-CCNC: 11 U/L (ref 15–37)
BILIRUB SERPL-MCNC: 1.2 MG/DL (ref 0.2–1)
BUN SERPL-MCNC: 19.5 MG/DL (ref 7–18)
CALCIUM SERPL-MCNC: 9.7 MG/DL (ref 8.5–10.1)
CHLORIDE SERPL-SCNC: 101 MMOL/L (ref 98–107)
CO2 SERPL-SCNC: 31 MMOL/L (ref 21–32)
CREAT SERPL-MCNC: 0.9 MG/DL (ref 0.55–1.3)
DEPRECATED RDW RBC AUTO: 14.9 % (ref 11.6–15.6)
GLUCOSE SERPL-MCNC: 120 MG/DL (ref 74–106)
HCT VFR BLD CALC: 36.5 % (ref 32.4–45.2)
HGB BLD-MCNC: 11.4 GM/DL (ref 10.7–15.3)
MAGNESIUM SERPL-MCNC: 2 MG/DL (ref 1.8–2.4)
MCH RBC QN AUTO: 24.1 PG (ref 25.7–33.7)
MCHC RBC AUTO-ENTMCNC: 31.3 G/DL (ref 32–36)
MCV RBC: 76.8 FL (ref 80–96)
PHOSPHATE SERPL-MCNC: 1.9 MG/DL (ref 2.5–4.9)
PLATELET # BLD AUTO: 221 10^3/UL (ref 134–434)
PMV BLD: 8.7 FL (ref 7.5–11.1)
PROT SERPL-MCNC: 6.8 G/DL (ref 6.4–8.2)
RBC # BLD AUTO: 4.75 M/MM3 (ref 3.6–5.2)
SODIUM SERPL-SCNC: 138 MMOL/L (ref 136–145)
WBC # BLD AUTO: 6.2 K/MM3 (ref 4–10)

## 2022-02-12 RX ADMIN — ENALAPRILAT SCH MG: 1.25 INJECTION INTRAVENOUS at 06:49

## 2022-02-12 RX ADMIN — ENOXAPARIN SODIUM SCH MG: 40 INJECTION SUBCUTANEOUS at 09:26

## 2022-02-12 RX ADMIN — SODIUM CHLORIDE SCH MLS/HR: 9 INJECTION, SOLUTION INTRAVENOUS at 00:09

## 2022-02-12 RX ADMIN — ENALAPRILAT SCH MG: 1.25 INJECTION INTRAVENOUS at 00:09

## 2022-02-12 RX ADMIN — ENALAPRILAT SCH MG: 1.25 INJECTION INTRAVENOUS at 13:25

## 2022-02-12 RX ADMIN — ENALAPRILAT SCH MG: 1.25 INJECTION INTRAVENOUS at 17:55

## 2022-02-12 RX ADMIN — ENALAPRILAT SCH MG: 1.25 INJECTION INTRAVENOUS at 23:13

## 2022-02-13 LAB
ALBUMIN SERPL-MCNC: 3.5 G/DL (ref 3.4–5)
ALP SERPL-CCNC: 53 U/L (ref 45–117)
ALT SERPL-CCNC: 13 U/L (ref 13–61)
ANION GAP SERPL CALC-SCNC: 9 MMOL/L (ref 8–16)
AST SERPL-CCNC: 13 U/L (ref 15–37)
BASOPHILS # BLD: 0.4 % (ref 0–2)
BILIRUB SERPL-MCNC: 0.6 MG/DL (ref 0.2–1)
BUN SERPL-MCNC: 18 MG/DL (ref 7–18)
CALCIUM SERPL-MCNC: 9.7 MG/DL (ref 8.5–10.1)
CHLORIDE SERPL-SCNC: 100 MMOL/L (ref 98–107)
CO2 SERPL-SCNC: 32 MMOL/L (ref 21–32)
CREAT SERPL-MCNC: 0.9 MG/DL (ref 0.55–1.3)
DEPRECATED RDW RBC AUTO: 15.2 % (ref 11.6–15.6)
EOSINOPHIL # BLD: 0.7 % (ref 0–4.5)
GLUCOSE SERPL-MCNC: 96 MG/DL (ref 74–106)
HCT VFR BLD CALC: 37.4 % (ref 32.4–45.2)
HGB BLD-MCNC: 11.7 GM/DL (ref 10.7–15.3)
LYMPHOCYTES # BLD: 33.8 % (ref 8–40)
MCH RBC QN AUTO: 24 PG (ref 25.7–33.7)
MCHC RBC AUTO-ENTMCNC: 31.2 G/DL (ref 32–36)
MCV RBC: 77.1 FL (ref 80–96)
MONOCYTES # BLD AUTO: 11.2 % (ref 3.8–10.2)
NEUTROPHILS # BLD: 53.9 % (ref 42.8–82.8)
PLATELET # BLD AUTO: 244 10^3/UL (ref 134–434)
PMV BLD: 8.6 FL (ref 7.5–11.1)
PROT SERPL-MCNC: 7.1 G/DL (ref 6.4–8.2)
RBC # BLD AUTO: 4.85 M/MM3 (ref 3.6–5.2)
SODIUM SERPL-SCNC: 140 MMOL/L (ref 136–145)
WBC # BLD AUTO: 8.7 K/MM3 (ref 4–10)

## 2022-02-13 RX ADMIN — POTASSIUM CHLORIDE SCH MLS/HR: 7.46 INJECTION, SOLUTION INTRAVENOUS at 15:00

## 2022-02-13 RX ADMIN — ENALAPRILAT SCH MG: 1.25 INJECTION INTRAVENOUS at 15:06

## 2022-02-13 RX ADMIN — POTASSIUM CHLORIDE SCH MLS/HR: 7.46 INJECTION, SOLUTION INTRAVENOUS at 17:41

## 2022-02-13 RX ADMIN — ENOXAPARIN SODIUM SCH MG: 40 INJECTION SUBCUTANEOUS at 10:35

## 2022-02-13 RX ADMIN — ENALAPRILAT SCH MG: 1.25 INJECTION INTRAVENOUS at 05:19

## 2022-02-13 RX ADMIN — ENALAPRILAT SCH MG: 1.25 INJECTION INTRAVENOUS at 21:18

## 2022-02-13 RX ADMIN — SODIUM CHLORIDE SCH MLS/HR: 9 INJECTION, SOLUTION INTRAVENOUS at 23:07

## 2022-02-13 RX ADMIN — POTASSIUM CHLORIDE SCH MLS/HR: 7.46 INJECTION, SOLUTION INTRAVENOUS at 13:33

## 2022-02-13 RX ADMIN — SODIUM CHLORIDE SCH MLS/HR: 9 INJECTION, SOLUTION INTRAVENOUS at 03:53

## 2022-02-14 RX ADMIN — SODIUM CHLORIDE SCH MLS/HR: 9 INJECTION, SOLUTION INTRAVENOUS at 20:54

## 2022-02-14 RX ADMIN — ENALAPRILAT SCH MG: 1.25 INJECTION INTRAVENOUS at 16:19

## 2022-02-14 RX ADMIN — ENOXAPARIN SODIUM SCH MG: 40 INJECTION SUBCUTANEOUS at 10:44

## 2022-02-14 RX ADMIN — ENALAPRILAT SCH MG: 1.25 INJECTION INTRAVENOUS at 20:55

## 2022-02-14 RX ADMIN — ENALAPRILAT SCH MG: 1.25 INJECTION INTRAVENOUS at 10:44

## 2022-02-14 RX ADMIN — ENALAPRILAT SCH MG: 1.25 INJECTION INTRAVENOUS at 02:30

## 2022-02-15 LAB
ALBUMIN SERPL-MCNC: 3.2 G/DL (ref 3.4–5)
ALP SERPL-CCNC: 51 U/L (ref 45–117)
ALT SERPL-CCNC: 15 U/L (ref 13–61)
ANION GAP SERPL CALC-SCNC: 11 MMOL/L (ref 8–16)
AST SERPL-CCNC: 19 U/L (ref 15–37)
BILIRUB SERPL-MCNC: 0.7 MG/DL (ref 0.2–1)
BUN SERPL-MCNC: 17.8 MG/DL (ref 7–18)
CALCIUM SERPL-MCNC: 9.6 MG/DL (ref 8.5–10.1)
CHLORIDE SERPL-SCNC: 102 MMOL/L (ref 98–107)
CO2 SERPL-SCNC: 28 MMOL/L (ref 21–32)
CREAT SERPL-MCNC: 0.9 MG/DL (ref 0.55–1.3)
DEPRECATED RDW RBC AUTO: 15.4 % (ref 11.6–15.6)
GLUCOSE SERPL-MCNC: 127 MG/DL (ref 74–106)
HCT VFR BLD CALC: 36.5 % (ref 32.4–45.2)
HGB BLD-MCNC: 11.3 GM/DL (ref 10.7–15.3)
MAGNESIUM SERPL-MCNC: 1.9 MG/DL (ref 1.8–2.4)
MCH RBC QN AUTO: 24.1 PG (ref 25.7–33.7)
MCHC RBC AUTO-ENTMCNC: 31.1 G/DL (ref 32–36)
MCV RBC: 77.7 FL (ref 80–96)
PHOSPHATE SERPL-MCNC: 3.2 MG/DL (ref 2.5–4.9)
PLATELET # BLD AUTO: 288 10^3/UL (ref 134–434)
PMV BLD: 8.8 FL (ref 7.5–11.1)
PROT SERPL-MCNC: 6.6 G/DL (ref 6.4–8.2)
RBC # BLD AUTO: 4.7 M/MM3 (ref 3.6–5.2)
SODIUM SERPL-SCNC: 141 MMOL/L (ref 136–145)
WBC # BLD AUTO: 6.7 K/MM3 (ref 4–10)

## 2022-02-15 RX ADMIN — SODIUM CHLORIDE SCH: 9 INJECTION, SOLUTION INTRAVENOUS at 00:40

## 2022-02-15 RX ADMIN — GABAPENTIN SCH MG: 300 CAPSULE ORAL at 21:01

## 2022-02-15 RX ADMIN — ENOXAPARIN SODIUM SCH MG: 40 INJECTION SUBCUTANEOUS at 09:35

## 2022-02-15 RX ADMIN — ATENOLOL SCH MG: 25 TABLET ORAL at 09:47

## 2022-02-15 RX ADMIN — ENALAPRILAT SCH: 1.25 INJECTION INTRAVENOUS at 10:06

## 2022-02-15 RX ADMIN — INSULIN ASPART SCH UNITS: 100 INJECTION, SOLUTION INTRAVENOUS; SUBCUTANEOUS at 17:25

## 2022-02-15 RX ADMIN — ENALAPRILAT SCH MG: 1.25 INJECTION INTRAVENOUS at 02:02

## 2022-02-15 RX ADMIN — INSULIN ASPART SCH UNITS: 100 INJECTION, SOLUTION INTRAVENOUS; SUBCUTANEOUS at 21:02

## 2022-02-15 RX ADMIN — AMLODIPINE BESYLATE SCH MG: 10 TABLET ORAL at 09:47

## 2022-02-16 VITALS — SYSTOLIC BLOOD PRESSURE: 126 MMHG | DIASTOLIC BLOOD PRESSURE: 60 MMHG | HEART RATE: 59 BPM | TEMPERATURE: 97.8 F

## 2022-02-16 LAB
ANION GAP SERPL CALC-SCNC: 6 MMOL/L (ref 8–16)
BUN SERPL-MCNC: 13.1 MG/DL (ref 7–18)
CALCIUM SERPL-MCNC: 9.5 MG/DL (ref 8.5–10.1)
CHLORIDE SERPL-SCNC: 102 MMOL/L (ref 98–107)
CO2 SERPL-SCNC: 33 MMOL/L (ref 21–32)
CREAT SERPL-MCNC: 1.2 MG/DL (ref 0.55–1.3)
DEPRECATED RDW RBC AUTO: 15.3 % (ref 11.6–15.6)
GLUCOSE SERPL-MCNC: 126 MG/DL (ref 74–106)
HCT VFR BLD CALC: 34.4 % (ref 32.4–45.2)
HGB BLD-MCNC: 11.4 GM/DL (ref 10.7–15.3)
MAGNESIUM SERPL-MCNC: 2.1 MG/DL (ref 1.8–2.4)
MCH RBC QN AUTO: 25 PG (ref 25.7–33.7)
MCHC RBC AUTO-ENTMCNC: 33.1 G/DL (ref 32–36)
MCV RBC: 75.6 FL (ref 80–96)
PHOSPHATE SERPL-MCNC: 3.1 MG/DL (ref 2.5–4.9)
PLATELET # BLD AUTO: 296 10^3/UL (ref 134–434)
PMV BLD: 8 FL (ref 7.5–11.1)
RBC # BLD AUTO: 4.55 M/MM3 (ref 3.6–5.2)
SODIUM SERPL-SCNC: 141 MMOL/L (ref 136–145)
WBC # BLD AUTO: 6.3 K/MM3 (ref 4–10)

## 2022-02-16 RX ADMIN — GABAPENTIN SCH MG: 300 CAPSULE ORAL at 12:23

## 2022-02-16 RX ADMIN — ATENOLOL SCH MG: 25 TABLET ORAL at 12:23

## 2022-02-16 RX ADMIN — ENOXAPARIN SODIUM SCH MG: 40 INJECTION SUBCUTANEOUS at 09:31

## 2022-02-16 RX ADMIN — AMLODIPINE BESYLATE SCH MG: 10 TABLET ORAL at 12:23

## 2022-02-16 RX ADMIN — INSULIN ASPART SCH UNITS: 100 INJECTION, SOLUTION INTRAVENOUS; SUBCUTANEOUS at 06:15

## 2022-02-16 RX ADMIN — INSULIN ASPART SCH UNITS: 100 INJECTION, SOLUTION INTRAVENOUS; SUBCUTANEOUS at 12:21

## 2024-10-27 ENCOUNTER — HOSPITAL ENCOUNTER (INPATIENT)
Dept: HOSPITAL 74 - JER | Age: 69
LOS: 10 days | Discharge: HOME HEALTH | DRG: 336 | End: 2024-11-06
Attending: INTERNAL MEDICINE | Admitting: INTERNAL MEDICINE
Payer: COMMERCIAL

## 2024-10-27 VITALS — BODY MASS INDEX: 25 KG/M2

## 2024-10-27 DIAGNOSIS — I25.10: ICD-10-CM

## 2024-10-27 DIAGNOSIS — N17.9: ICD-10-CM

## 2024-10-27 DIAGNOSIS — K56.7: ICD-10-CM

## 2024-10-27 DIAGNOSIS — E87.20: ICD-10-CM

## 2024-10-27 DIAGNOSIS — K56.50: Primary | ICD-10-CM

## 2024-10-27 DIAGNOSIS — D64.9: ICD-10-CM

## 2024-10-27 DIAGNOSIS — N28.1: ICD-10-CM

## 2024-10-27 DIAGNOSIS — K91.89: ICD-10-CM

## 2024-10-27 DIAGNOSIS — K92.1: ICD-10-CM

## 2024-10-27 DIAGNOSIS — I47.29: ICD-10-CM

## 2024-10-27 DIAGNOSIS — E11.40: ICD-10-CM

## 2024-10-27 DIAGNOSIS — I27.20: ICD-10-CM
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
## 2024-10-27 DIAGNOSIS — I10: ICD-10-CM

## 2024-10-27 DIAGNOSIS — I16.0: ICD-10-CM

## 2024-10-27 DIAGNOSIS — K46.9: ICD-10-CM

## 2024-10-27 LAB
ALBUMIN SERPL-MCNC: 4.5 G/DL (ref 3.4–5)
ALP SERPL-CCNC: 75 U/L (ref 45–117)
ALT SERPL-CCNC: 31 U/L (ref 13–61)
ANION GAP SERPL CALC-SCNC: 12 MMOL/L (ref 4–13)
APPEARANCE UR: CLEAR
AST SERPL-CCNC: 18 U/L (ref 15–37)
BACTERIA # UR AUTO: 62 /UL (ref 0–1359)
BASOPHILS # BLD: 0.2 % (ref 0–2)
BILIRUB SERPL-MCNC: 0.9 MG/DL (ref 0.2–1)
BILIRUB UR STRIP.AUTO-MCNC: NEGATIVE MG/DL
BUN SERPL-MCNC: 25.4 MG/DL (ref 7–18)
CALCIUM SERPL-MCNC: 10.4 MG/DL (ref 8.5–10.1)
CASTS URNS QL MICRO: 0 /UL (ref 0–3.1)
CHLORIDE SERPL-SCNC: 93 MMOL/L (ref 98–107)
CO2 SERPL-SCNC: 28 MMOL/L (ref 21–32)
COLOR UR: YELLOW
CREAT SERPL-MCNC: 1.5 MG/DL (ref 0.55–1.3)
DEPRECATED RDW RBC AUTO: 13.9 % (ref 11.6–15.6)
EOSINOPHIL # BLD: 0 % (ref 0–4.5)
EPITH CASTS URNS QL MICRO: 13 /UL (ref 0–25.1)
GLUCOSE SERPL-MCNC: 311 MG/DL (ref 74–106)
HCT VFR BLD CALC: 46.1 % (ref 32.4–45.2)
HGB BLD-MCNC: 14.6 GM/DL (ref 10.7–15.3)
KETONES UR QL STRIP: (no result)
LACTATE SERPL-MCNC: 3.5 MMOL/L (ref 0.4–2)
LEUKOCYTE ESTERASE UR QL STRIP.AUTO: NEGATIVE
LYMPHOCYTES # BLD: 6.5 % (ref 8–40)
MCH RBC QN AUTO: 26.3 PG (ref 25.7–33.7)
MCHC RBC AUTO-ENTMCNC: 31.7 G/DL (ref 32–36)
MCV RBC: 83 FL (ref 80–96)
MONOCYTES # BLD AUTO: 3 % (ref 3.8–10.2)
NEUTROPHILS # BLD: 90.3 % (ref 42.8–82.8)
NITRITE UR QL STRIP: NEGATIVE
PH UR: 6.5 [PH] (ref 5–8)
PLATELET # BLD AUTO: 229 10^3/UL (ref 134–434)
PMV BLD: 8.1 FL (ref 7.5–11.1)
POTASSIUM SERPLBLD-SCNC: 4.2 MMOL/L (ref 3.5–5.1)
PROT SERPL-MCNC: 8.3 G/DL (ref 6.4–8.2)
PROT UR QL STRIP: (no result)
PROT UR QL STRIP: (no result)
RBC # BLD AUTO: 5.55 M/MM3 (ref 3.6–5.2)
RBC # BLD AUTO: 57 /UL (ref 0–23.9)
SODIUM SERPL-SCNC: 134 MMOL/L (ref 136–145)
SP GR UR: 1.03 (ref 1.01–1.03)
UROBILINOGEN UR STRIP-MCNC: 0.2 MG/DL (ref 0.2–1)
WBC # BLD AUTO: 11.5 K/MM3 (ref 4–10)
WBC # UR AUTO: 19 /UL (ref 0–25.8)

## 2024-10-27 RX ADMIN — INSULIN ASPART SCH UNITS: 100 INJECTION, SOLUTION INTRAVENOUS; SUBCUTANEOUS at 17:21

## 2024-10-27 RX ADMIN — MORPHINE SULFATE PRN MG: 2 INJECTION, SOLUTION INTRAMUSCULAR; INTRAVENOUS at 16:30

## 2024-10-27 RX ADMIN — DEXTROSE AND SODIUM CHLORIDE SCH MLS/HR: 5; 900 INJECTION, SOLUTION INTRAVENOUS at 17:22

## 2024-10-27 RX ADMIN — HEPARIN SODIUM SCH UNIT: 5000 INJECTION, SOLUTION INTRAVENOUS; SUBCUTANEOUS at 21:11

## 2024-10-27 RX ADMIN — ONDANSETRON ONE MG: 2 INJECTION INTRAMUSCULAR; INTRAVENOUS at 10:13

## 2024-10-27 RX ADMIN — LABETALOL HYDROCHLORIDE ONE MG: 5 INJECTION, SOLUTION INTRAVENOUS at 15:20

## 2024-10-27 RX ADMIN — SODIUM CHLORIDE ONE ML: 9 INJECTION, SOLUTION INTRAVENOUS at 11:44

## 2024-10-27 RX ADMIN — ACETAMINOPHEN ONE MG: 10 INJECTION, SOLUTION INTRAVENOUS at 10:13

## 2024-10-27 RX ADMIN — FAMOTIDINE ONE MLS/HR: 20 INJECTION, SOLUTION INTRAVENOUS at 10:13

## 2024-10-27 RX ADMIN — LIDOCAINE HYDROCHLORIDE ONE ML: 20 JELLY TOPICAL at 15:10

## 2024-10-28 LAB
ALBUMIN SERPL-MCNC: 3.6 G/DL (ref 3.4–5)
ALP SERPL-CCNC: 52 U/L (ref 45–117)
ALT SERPL-CCNC: 25 U/L (ref 13–61)
ANION GAP SERPL CALC-SCNC: 9 MMOL/L (ref 4–13)
APTT BLD: 29.3 SECONDS (ref 25.2–36.5)
AST SERPL-CCNC: 13 U/L (ref 15–37)
BILIRUB SERPL-MCNC: 0.5 MG/DL (ref 0.2–1)
BUN SERPL-MCNC: 23.4 MG/DL (ref 7–18)
CALCIUM SERPL-MCNC: 9.5 MG/DL (ref 8.5–10.1)
CHLORIDE SERPL-SCNC: 102 MMOL/L (ref 98–107)
CO2 SERPL-SCNC: 26 MMOL/L (ref 21–32)
CREAT SERPL-MCNC: 1.2 MG/DL (ref 0.55–1.3)
DEPRECATED RDW RBC AUTO: 14.3 % (ref 11.6–15.6)
GLUCOSE SERPL-MCNC: 230 MG/DL (ref 74–106)
HCT VFR BLD CALC: 44.4 % (ref 32.4–45.2)
HGB BLD-MCNC: 14.4 GM/DL (ref 10.7–15.3)
INR BLD: 1.14 (ref 0.83–1.09)
MAGNESIUM SERPL-MCNC: 1.8 MG/DL (ref 1.8–2.4)
MCH RBC QN AUTO: 26.4 PG (ref 25.7–33.7)
MCHC RBC AUTO-ENTMCNC: 32.4 G/DL (ref 32–36)
MCV RBC: 81.3 FL (ref 80–96)
PHOSPHATE SERPL-MCNC: 2 MG/DL (ref 2.5–4.9)
PLATELET # BLD AUTO: 202 10^3/UL (ref 134–434)
PMV BLD: 8.3 FL (ref 7.5–11.1)
POTASSIUM SERPLBLD-SCNC: 3.5 MMOL/L (ref 3.5–5.1)
PROT SERPL-MCNC: 6.6 G/DL (ref 6.4–8.2)
PT PNL PPP: 12.8 SEC (ref 9.7–13)
RBC # BLD AUTO: 5.47 M/MM3 (ref 3.6–5.2)
SODIUM SERPL-SCNC: 137 MMOL/L (ref 136–145)
WBC # BLD AUTO: 5.3 K/MM3 (ref 4–10)

## 2024-10-28 RX ADMIN — AMLODIPINE BESYLATE SCH MG: 10 TABLET ORAL at 16:03

## 2024-10-28 RX ADMIN — PANTOPRAZOLE SODIUM SCH MG: 40 TABLET, DELAYED RELEASE ORAL at 16:03

## 2024-10-28 RX ADMIN — IOHEXOL ONE ML: 350 INJECTION, SOLUTION INTRAVENOUS at 16:08

## 2024-10-28 RX ADMIN — SODIUM CHLORIDE STA MLS/HR: 9 INJECTION, SOLUTION INTRAVENOUS at 14:45

## 2024-10-28 RX ADMIN — SODIUM CHLORIDE STA: 9 INJECTION, SOLUTION INTRAVENOUS at 16:26

## 2024-10-28 RX ADMIN — ACETAMINOPHEN SCH MG: 10 INJECTION, SOLUTION INTRAVENOUS at 13:11

## 2024-10-28 RX ADMIN — PANTOPRAZOLE SODIUM SCH MG: 40 INJECTION, POWDER, FOR SOLUTION INTRAVENOUS at 21:42

## 2024-10-28 RX ADMIN — CARVEDILOL SCH: 25 TABLET, FILM COATED ORAL at 21:41

## 2024-10-28 RX ADMIN — LABETALOL HYDROCHLORIDE PRN MG: 5 INJECTION, SOLUTION INTRAVENOUS at 19:11

## 2024-10-29 LAB
ANION GAP SERPL CALC-SCNC: 7 MMOL/L (ref 4–13)
BUN SERPL-MCNC: 22.4 MG/DL (ref 7–18)
CALCIUM SERPL-MCNC: 9.7 MG/DL (ref 8.5–10.1)
CHLORIDE SERPL-SCNC: 104 MMOL/L (ref 98–107)
CO2 SERPL-SCNC: 27 MMOL/L (ref 21–32)
CREAT SERPL-MCNC: 1 MG/DL (ref 0.55–1.3)
DEPRECATED RDW RBC AUTO: 14.2 % (ref 11.6–15.6)
GLUCOSE SERPL-MCNC: 207 MG/DL (ref 74–106)
HCT VFR BLD CALC: 41.5 % (ref 32.4–45.2)
HGB BLD-MCNC: 13.7 GM/DL (ref 10.7–15.3)
MAGNESIUM SERPL-MCNC: 2 MG/DL (ref 1.8–2.4)
MCH RBC QN AUTO: 26.8 PG (ref 25.7–33.7)
MCHC RBC AUTO-ENTMCNC: 33.1 G/DL (ref 32–36)
MCV RBC: 80.9 FL (ref 80–96)
PHOSPHATE SERPL-MCNC: 2.4 MG/DL (ref 2.5–4.9)
PLATELET # BLD AUTO: 187 10^3/UL (ref 134–434)
PMV BLD: 8.4 FL (ref 7.5–11.1)
POTASSIUM SERPLBLD-SCNC: 3.3 MMOL/L (ref 3.5–5.1)
RBC # BLD AUTO: 5.12 M/MM3 (ref 3.6–5.2)
SODIUM SERPL-SCNC: 138 MMOL/L (ref 136–145)
WBC # BLD AUTO: 6.1 K/MM3 (ref 4–10)

## 2024-10-29 PROCEDURE — 0DJD4ZZ INSPECTION OF LOWER INTESTINAL TRACT, PERCUTANEOUS ENDOSCOPIC APPROACH: ICD-10-PCS | Performed by: SURGERY

## 2024-10-29 PROCEDURE — 0DN80ZZ RELEASE SMALL INTESTINE, OPEN APPROACH: ICD-10-PCS | Performed by: SURGERY

## 2024-10-29 RX ADMIN — AMLODIPINE BESYLATE SCH MG: 10 TABLET ORAL at 08:29

## 2024-10-29 RX ADMIN — CEFOXITIN ONE GM: 2 INJECTION, POWDER, FOR SOLUTION INTRAVENOUS at 11:00

## 2024-10-29 RX ADMIN — POTASSIUM CHLORIDE SCH MLS/HR: 7.46 INJECTION, SOLUTION INTRAVENOUS at 19:34

## 2024-10-29 RX ADMIN — CARVEDILOL SCH MG: 25 TABLET, FILM COATED ORAL at 22:18

## 2024-10-29 RX ADMIN — Medication SCH MG: at 14:41

## 2024-10-29 RX ADMIN — SODIUM CHLORIDE, POTASSIUM CHLORIDE, SODIUM LACTATE AND CALCIUM CHLORIDE SCH MLS/HR: 600; 310; 30; 20 INJECTION, SOLUTION INTRAVENOUS at 14:18

## 2024-10-29 RX ADMIN — POVIDONE-IODINE ONE APPLIC: 100 OINTMENT TOPICAL at 13:12

## 2024-10-29 RX ADMIN — HEPARIN SODIUM SCH UNIT: 5000 INJECTION, SOLUTION INTRAVENOUS; SUBCUTANEOUS at 17:48

## 2024-10-29 RX ADMIN — PANTOPRAZOLE SODIUM SCH MG: 40 INJECTION, POWDER, FOR SOLUTION INTRAVENOUS at 22:18

## 2024-10-29 RX ADMIN — INSULIN ASPART SCH: 100 INJECTION, SOLUTION INTRAVENOUS; SUBCUTANEOUS at 22:17

## 2024-10-29 RX ADMIN — BUPIVACAINE HYDROCHLORIDE ONE ML: 2.5 INJECTION, SOLUTION EPIDURAL; INFILTRATION; INTRACAUDAL; PERINEURAL at 00:00

## 2024-10-29 RX ADMIN — POTASSIUM CHLORIDE SCH MLS/HR: 7.46 INJECTION, SOLUTION INTRAVENOUS at 17:50

## 2024-10-29 RX ADMIN — ACETAMINOPHEN SCH MG: 10 INJECTION, SOLUTION INTRAVENOUS at 14:03

## 2024-10-30 LAB
ANION GAP SERPL CALC-SCNC: 7 MMOL/L (ref 4–13)
BUN SERPL-MCNC: 37.5 MG/DL (ref 7–18)
CALCIUM SERPL-MCNC: 8.8 MG/DL (ref 8.5–10.1)
CHLORIDE SERPL-SCNC: 110 MMOL/L (ref 98–107)
CO2 SERPL-SCNC: 24 MMOL/L (ref 21–32)
CREAT SERPL-MCNC: 2.2 MG/DL (ref 0.55–1.3)
DEPRECATED RDW RBC AUTO: 14.8 % (ref 11.6–15.6)
GLUCOSE SERPL-MCNC: 185 MG/DL (ref 74–106)
HCT VFR BLD CALC: 36.8 % (ref 32.4–45.2)
HGB BLD-MCNC: 11.9 GM/DL (ref 10.7–15.3)
MAGNESIUM SERPL-MCNC: 1.7 MG/DL (ref 1.8–2.4)
MCH RBC QN AUTO: 26.5 PG (ref 25.7–33.7)
MCHC RBC AUTO-ENTMCNC: 32.3 G/DL (ref 32–36)
MCV RBC: 82.1 FL (ref 80–96)
PHOSPHATE SERPL-MCNC: 4.1 MG/DL (ref 2.5–4.9)
PLATELET # BLD AUTO: 186 10^3/UL (ref 134–434)
PMV BLD: 8.7 FL (ref 7.5–11.1)
POTASSIUM SERPLBLD-SCNC: 4 MMOL/L (ref 3.5–5.1)
RBC # BLD AUTO: 4.49 M/MM3 (ref 3.6–5.2)
SODIUM SERPL-SCNC: 141 MMOL/L (ref 136–145)
WBC # BLD AUTO: 9 K/MM3 (ref 4–10)

## 2024-10-30 RX ADMIN — MAGNESIUM SULFATE HEPTAHYDRATE ONE GM: 40 INJECTION, SOLUTION INTRAVENOUS at 15:40

## 2024-10-30 RX ADMIN — SODIUM CHLORIDE, SODIUM LACTATE, POTASSIUM CHLORIDE, CALCIUM CHLORIDE AND DEXTROSE MONOHYDRATE SCH MLS/HR: 5; 600; 310; 30; 20 INJECTION, SOLUTION INTRAVENOUS at 15:32

## 2024-10-30 RX ADMIN — HEPARIN SODIUM SCH UNIT: 5000 INJECTION, SOLUTION INTRAVENOUS; SUBCUTANEOUS at 21:52

## 2024-10-30 RX ADMIN — AMLODIPINE BESYLATE SCH: 10 TABLET ORAL at 12:20

## 2024-10-31 LAB
ALBUMIN SERPL-MCNC: 2.1 G/DL (ref 3.4–5)
ALBUMIN SERPL-MCNC: 2.2 G/DL (ref 3.4–5)
ALBUMIN SERPL-MCNC: 2.4 G/DL (ref 3.4–5)
ALP SERPL-CCNC: 43 U/L (ref 45–117)
ALP SERPL-CCNC: 43 U/L (ref 45–117)
ALP SERPL-CCNC: 49 U/L (ref 45–117)
ALT SERPL-CCNC: 25 U/L (ref 13–61)
ANION GAP SERPL CALC-SCNC: 2 MMOL/L (ref 4–13)
ANION GAP SERPL CALC-SCNC: 5 MMOL/L (ref 4–13)
ANION GAP SERPL CALC-SCNC: 6 MMOL/L (ref 4–13)
ANISOCYTOSIS BLD QL: 0
AST SERPL-CCNC: 19 U/L (ref 15–37)
AST SERPL-CCNC: 20 U/L (ref 15–37)
AST SERPL-CCNC: 26 U/L (ref 15–37)
BASOPHILS # BLD: 0.3 % (ref 0–2)
BILIRUB SERPL-MCNC: 0.4 MG/DL (ref 0.2–1)
BUN SERPL-MCNC: 23.8 MG/DL (ref 7–18)
BUN SERPL-MCNC: 25.5 MG/DL (ref 7–18)
BUN SERPL-MCNC: 32.4 MG/DL (ref 7–18)
CALCIUM SERPL-MCNC: 8.6 MG/DL (ref 8.5–10.1)
CALCIUM SERPL-MCNC: 8.8 MG/DL (ref 8.5–10.1)
CALCIUM SERPL-MCNC: 9.1 MG/DL (ref 8.5–10.1)
CHLORIDE SERPL-SCNC: 114 MMOL/L (ref 98–107)
CHLORIDE SERPL-SCNC: 115 MMOL/L (ref 98–107)
CHLORIDE SERPL-SCNC: 118 MMOL/L (ref 98–107)
CO2 SERPL-SCNC: 23 MMOL/L (ref 21–32)
CO2 SERPL-SCNC: 24 MMOL/L (ref 21–32)
CO2 SERPL-SCNC: 24 MMOL/L (ref 21–32)
CREAT SERPL-MCNC: 1.3 MG/DL (ref 0.55–1.3)
CREAT SERPL-MCNC: 1.3 MG/DL (ref 0.55–1.3)
CREAT SERPL-MCNC: 1.5 MG/DL (ref 0.55–1.3)
DEPRECATED RDW RBC AUTO: 14.8 % (ref 11.6–15.6)
DEPRECATED RDW RBC AUTO: 15.1 % (ref 11.6–15.6)
EOSINOPHIL # BLD: 5.1 % (ref 0–4.5)
GLUCOSE SERPL-MCNC: 265 MG/DL (ref 74–106)
GLUCOSE SERPL-MCNC: 464 MG/DL (ref 74–106)
GLUCOSE SERPL-MCNC: 578 MG/DL (ref 74–106)
HCT VFR BLD CALC: 30.1 % (ref 32.4–45.2)
HCT VFR BLD CALC: 33.2 % (ref 32.4–45.2)
HGB BLD-MCNC: 10.6 GM/DL (ref 10.7–15.3)
HGB BLD-MCNC: 9.3 GM/DL (ref 10.7–15.3)
LACTATE SERPL-MCNC: 5.2 MMOL/L (ref 0.4–2)
LYMPHOCYTES # BLD: 15.6 % (ref 8–40)
MACROCYTES BLD QL: 0
MAGNESIUM SERPL-MCNC: 1.7 MG/DL (ref 1.8–2.4)
MAGNESIUM SERPL-MCNC: 1.9 MG/DL (ref 1.8–2.4)
MCH RBC QN AUTO: 26.5 PG (ref 25.7–33.7)
MCH RBC QN AUTO: 26.5 PG (ref 25.7–33.7)
MCHC RBC AUTO-ENTMCNC: 30.8 G/DL (ref 32–36)
MCHC RBC AUTO-ENTMCNC: 31.8 G/DL (ref 32–36)
MCV RBC: 83.4 FL (ref 80–96)
MCV RBC: 86.2 FL (ref 80–96)
MONOCYTES # BLD AUTO: 16.8 % (ref 3.8–10.2)
NEUTROPHILS # BLD: 62.2 % (ref 42.8–82.8)
PHOSPHATE SERPL-MCNC: 1.4 MG/DL (ref 2.5–4.9)
PHOSPHATE SERPL-MCNC: 1.8 MG/DL (ref 2.5–4.9)
PLATELET # BLD AUTO: 163 10^3/UL (ref 134–434)
PLATELET # BLD AUTO: 186 10^3/UL (ref 134–434)
PMV BLD: 8.1 FL (ref 7.5–11.1)
PMV BLD: 8.6 FL (ref 7.5–11.1)
POTASSIUM SERPLBLD-SCNC: 3.4 MMOL/L (ref 3.5–5.1)
POTASSIUM SERPLBLD-SCNC: 3.4 MMOL/L (ref 3.5–5.1)
POTASSIUM SERPLBLD-SCNC: 3.7 MMOL/L (ref 3.5–5.1)
PROT SERPL-MCNC: 4.8 G/DL (ref 6.4–8.2)
PROT SERPL-MCNC: 5 G/DL (ref 6.4–8.2)
PROT SERPL-MCNC: 5.5 G/DL (ref 6.4–8.2)
RBC # BLD AUTO: 3.5 M/MM3 (ref 3.6–5.2)
RBC # BLD AUTO: 3.98 M/MM3 (ref 3.6–5.2)
SODIUM SERPL-SCNC: 143 MMOL/L (ref 136–145)
SODIUM SERPL-SCNC: 144 MMOL/L (ref 136–145)
SODIUM SERPL-SCNC: 145 MMOL/L (ref 136–145)
WBC # BLD AUTO: 4.7 K/MM3 (ref 4–10)
WBC # BLD AUTO: 5.1 K/MM3 (ref 4–10)

## 2024-10-31 RX ADMIN — MAGNESIUM SULFATE IN DEXTROSE ONE MLS/HR: 10 INJECTION, SOLUTION INTRAVENOUS at 20:37

## 2024-10-31 RX ADMIN — POTASSIUM CHLORIDE SCH MLS/HR: 7.46 INJECTION, SOLUTION INTRAVENOUS at 20:37

## 2024-10-31 RX ADMIN — CARVEDILOL SCH: 25 TABLET, FILM COATED ORAL at 22:22

## 2024-10-31 RX ADMIN — POTASSIUM & SODIUM PHOSPHATES POWDER PACK 280-160-250 MG ONE PACKET: 280-160-250 PACK at 17:05

## 2024-10-31 RX ADMIN — SODIUM CHLORIDE SCH MLS/HR: 9 INJECTION, SOLUTION INTRAVENOUS at 22:30

## 2024-10-31 RX ADMIN — SODIUM CHLORIDE ONE MLS/HR: 9 INJECTION, SOLUTION INTRAVENOUS at 21:12

## 2024-11-01 LAB
ALBUMIN SERPL-MCNC: 2.1 G/DL (ref 3.4–5)
ALP SERPL-CCNC: 42 U/L (ref 45–117)
ALT SERPL-CCNC: 20 U/L (ref 13–61)
ANION GAP SERPL CALC-SCNC: 3 MMOL/L (ref 4–13)
ANISOCYTOSIS BLD QL: 0
AST SERPL-CCNC: 17 U/L (ref 15–37)
BILIRUB SERPL-MCNC: 0.4 MG/DL (ref 0.2–1)
BUN SERPL-MCNC: 21 MG/DL (ref 7–18)
CALCIUM SERPL-MCNC: 8.7 MG/DL (ref 8.5–10.1)
CHLORIDE SERPL-SCNC: 116 MMOL/L (ref 98–107)
CO2 SERPL-SCNC: 26 MMOL/L (ref 21–32)
CREAT SERPL-MCNC: 1.1 MG/DL (ref 0.55–1.3)
DEPRECATED RDW RBC AUTO: 15 % (ref 11.6–15.6)
GLUCOSE SERPL-MCNC: 181 MG/DL (ref 74–106)
HCT VFR BLD CALC: 30.7 % (ref 32.4–45.2)
HGB BLD-MCNC: 9.9 GM/DL (ref 10.7–15.3)
MACROCYTES BLD QL: 0
MAGNESIUM SERPL-MCNC: 1.8 MG/DL (ref 1.8–2.4)
MCH RBC QN AUTO: 26.9 PG (ref 25.7–33.7)
MCHC RBC AUTO-ENTMCNC: 32.1 G/DL (ref 32–36)
MCV RBC: 83.6 FL (ref 80–96)
PHOSPHATE SERPL-MCNC: 1.8 MG/DL (ref 2.5–4.9)
PLATELET # BLD AUTO: 187 10^3/UL (ref 134–434)
PMV BLD: 8.4 FL (ref 7.5–11.1)
POTASSIUM SERPLBLD-SCNC: 3.6 MMOL/L (ref 3.5–5.1)
PROT SERPL-MCNC: 5 G/DL (ref 6.4–8.2)
RBC # BLD AUTO: 3.67 M/MM3 (ref 3.6–5.2)
SODIUM SERPL-SCNC: 146 MMOL/L (ref 136–145)
WBC # BLD AUTO: 5.2 K/MM3 (ref 4–10)

## 2024-11-01 RX ADMIN — POTASSIUM & SODIUM PHOSPHATES POWDER PACK 280-160-250 MG ONE PACKET: 280-160-250 PACK at 17:09

## 2024-11-01 RX ADMIN — GABAPENTIN SCH MG: 300 CAPSULE ORAL at 21:10

## 2024-11-01 RX ADMIN — MAGNESIUM SULFATE HEPTAHYDRATE ONE MLS/HR: 40 INJECTION, SOLUTION INTRAVENOUS at 17:09

## 2024-11-01 RX ADMIN — SODIUM CHLORIDE SCH MLS/HR: 9 INJECTION, SOLUTION INTRAVENOUS at 12:16

## 2024-11-02 LAB
ALBUMIN SERPL-MCNC: 2.1 G/DL (ref 3.4–5)
ALP SERPL-CCNC: 42 U/L (ref 45–117)
ALT SERPL-CCNC: 23 U/L (ref 13–61)
ANION GAP SERPL CALC-SCNC: 4 MMOL/L (ref 4–13)
ANISOCYTOSIS BLD QL: 0
AST SERPL-CCNC: 19 U/L (ref 15–37)
BILIRUB SERPL-MCNC: 0.3 MG/DL (ref 0.2–1)
BUN SERPL-MCNC: 16.4 MG/DL (ref 7–18)
CALCIUM SERPL-MCNC: 8.8 MG/DL (ref 8.5–10.1)
CHLORIDE SERPL-SCNC: 116 MMOL/L (ref 98–107)
CO2 SERPL-SCNC: 26 MMOL/L (ref 21–32)
CREAT SERPL-MCNC: 1.1 MG/DL (ref 0.55–1.3)
DEPRECATED RDW RBC AUTO: 14.9 % (ref 11.6–15.6)
GLUCOSE SERPL-MCNC: 181 MG/DL (ref 74–106)
HCT VFR BLD CALC: 32 % (ref 32.4–45.2)
HGB BLD-MCNC: 10.2 GM/DL (ref 10.7–15.3)
MACROCYTES BLD QL: 0
MAGNESIUM SERPL-MCNC: 1.9 MG/DL (ref 1.8–2.4)
MCH RBC QN AUTO: 26.8 PG (ref 25.7–33.7)
MCHC RBC AUTO-ENTMCNC: 31.7 G/DL (ref 32–36)
MCV RBC: 84.4 FL (ref 80–96)
PHOSPHATE SERPL-MCNC: 2.2 MG/DL (ref 2.5–4.9)
PLATELET # BLD AUTO: 69 10^3/UL (ref 134–434)
PMV BLD: 8.8 FL (ref 7.5–11.1)
POTASSIUM SERPLBLD-SCNC: 3.3 MMOL/L (ref 3.5–5.1)
PROT SERPL-MCNC: 4.9 G/DL (ref 6.4–8.2)
RBC # BLD AUTO: 3.79 M/MM3 (ref 3.6–5.2)
SODIUM SERPL-SCNC: 146 MMOL/L (ref 136–145)
WBC # BLD AUTO: 6.5 K/MM3 (ref 4–10)

## 2024-11-02 RX ADMIN — POTASSIUM CHLORIDE SCH MEQ: 1500 TABLET, EXTENDED RELEASE ORAL at 21:05

## 2024-11-02 RX ADMIN — AMLODIPINE BESYLATE SCH MG: 10 TABLET ORAL at 10:07

## 2024-11-02 RX ADMIN — CARVEDILOL SCH: 12.5 TABLET, FILM COATED ORAL at 21:06

## 2024-11-02 RX ADMIN — POTASSIUM PHOSPHATE, MONOBASIC AND POTASSIUM PHOSPHATE, DIBASIC ONE MLS/HR: 224; 236 INJECTION, SOLUTION, CONCENTRATE INTRAVENOUS at 10:19

## 2024-11-02 RX ADMIN — ONDANSETRON PRN MG: 2 INJECTION INTRAMUSCULAR; INTRAVENOUS at 08:04

## 2024-11-03 LAB
ALBUMIN SERPL-MCNC: 2.2 G/DL (ref 3.4–5)
ALP SERPL-CCNC: 44 U/L (ref 45–117)
ALT SERPL-CCNC: 23 U/L (ref 13–61)
ANION GAP SERPL CALC-SCNC: 4 MMOL/L (ref 4–13)
AST SERPL-CCNC: 16 U/L (ref 15–37)
BASOPHILS # BLD: 0.5 % (ref 0–2)
BILIRUB SERPL-MCNC: 0.4 MG/DL (ref 0.2–1)
BUN SERPL-MCNC: 18 MG/DL (ref 7–18)
CALCIUM SERPL-MCNC: 8.7 MG/DL (ref 8.5–10.1)
CHLORIDE SERPL-SCNC: 114 MMOL/L (ref 98–107)
CO2 SERPL-SCNC: 26 MMOL/L (ref 21–32)
CREAT SERPL-MCNC: 1.2 MG/DL (ref 0.55–1.3)
DEPRECATED RDW RBC AUTO: 14.9 % (ref 11.6–15.6)
EOSINOPHIL # BLD: 3 % (ref 0–4.5)
GLUCOSE SERPL-MCNC: 190 MG/DL (ref 74–106)
HCT VFR BLD CALC: 30.4 % (ref 32.4–45.2)
HGB BLD-MCNC: 9.7 GM/DL (ref 10.7–15.3)
LYMPHOCYTES # BLD: 16.8 % (ref 8–40)
MAGNESIUM SERPL-MCNC: 1.5 MG/DL (ref 1.8–2.4)
MCH RBC QN AUTO: 26.6 PG (ref 25.7–33.7)
MCHC RBC AUTO-ENTMCNC: 32 G/DL (ref 32–36)
MCV RBC: 83.2 FL (ref 80–96)
MONOCYTES # BLD AUTO: 9.3 % (ref 3.8–10.2)
NEUTROPHILS # BLD: 70.4 % (ref 42.8–82.8)
PHOSPHATE SERPL-MCNC: 2.6 MG/DL (ref 2.5–4.9)
PLATELET # BLD AUTO: 223 10^3/UL (ref 134–434)
PMV BLD: 7.6 FL (ref 7.5–11.1)
POTASSIUM SERPLBLD-SCNC: 4.3 MMOL/L (ref 3.5–5.1)
PROT SERPL-MCNC: 5.2 G/DL (ref 6.4–8.2)
RBC # BLD AUTO: 3.66 M/MM3 (ref 3.6–5.2)
SODIUM SERPL-SCNC: 143 MMOL/L (ref 136–145)
WBC # BLD AUTO: 10 K/MM3 (ref 4–10)

## 2024-11-03 RX ADMIN — MAGNESIUM SULFATE HEPTAHYDRATE ONE MLS/HR: 40 INJECTION, SOLUTION INTRAVENOUS at 18:10

## 2024-11-03 RX ADMIN — CARVEDILOL SCH MG: 12.5 TABLET, FILM COATED ORAL at 09:57

## 2024-11-04 LAB
ALBUMIN SERPL-MCNC: 2.4 G/DL (ref 3.4–5)
ALP SERPL-CCNC: 49 U/L (ref 45–117)
ALT SERPL-CCNC: 32 U/L (ref 13–61)
ANION GAP SERPL CALC-SCNC: 3 MMOL/L (ref 4–13)
ANISOCYTOSIS BLD QL: 0
AST SERPL-CCNC: 27 U/L (ref 15–37)
BILIRUB SERPL-MCNC: 0.4 MG/DL (ref 0.2–1)
BUN SERPL-MCNC: 13.5 MG/DL (ref 7–18)
CALCIUM SERPL-MCNC: 9.4 MG/DL (ref 8.5–10.1)
CHLORIDE SERPL-SCNC: 109 MMOL/L (ref 98–107)
CO2 SERPL-SCNC: 27 MMOL/L (ref 21–32)
CREAT SERPL-MCNC: 1 MG/DL (ref 0.55–1.3)
DEPRECATED RDW RBC AUTO: 14.4 % (ref 11.6–15.6)
GLUCOSE SERPL-MCNC: 182 MG/DL (ref 74–106)
HCT VFR BLD CALC: 32.4 % (ref 32.4–45.2)
HGB BLD-MCNC: 10.4 GM/DL (ref 10.7–15.3)
MACROCYTES BLD QL: 0
MAGNESIUM SERPL-MCNC: 1.7 MG/DL (ref 1.8–2.4)
MCH RBC QN AUTO: 26.4 PG (ref 25.7–33.7)
MCHC RBC AUTO-ENTMCNC: 32 G/DL (ref 32–36)
MCV RBC: 82.6 FL (ref 80–96)
PHOSPHATE SERPL-MCNC: 2.3 MG/DL (ref 2.5–4.9)
PLATELET # BLD AUTO: 265 10^3/UL (ref 134–434)
PMV BLD: 7.8 FL (ref 7.5–11.1)
POTASSIUM SERPLBLD-SCNC: 4.9 MMOL/L (ref 3.5–5.1)
PROT SERPL-MCNC: 5.7 G/DL (ref 6.4–8.2)
RBC # BLD AUTO: 3.92 M/MM3 (ref 3.6–5.2)
SODIUM SERPL-SCNC: 139 MMOL/L (ref 136–145)
WBC # BLD AUTO: 9.8 K/MM3 (ref 4–10)

## 2024-11-04 RX ADMIN — POTASSIUM & SODIUM PHOSPHATES POWDER PACK 280-160-250 MG ONE PACKET: 280-160-250 PACK at 11:58

## 2024-11-04 RX ADMIN — MAGNESIUM SULFATE HEPTAHYDRATE ONE GM: 40 INJECTION, SOLUTION INTRAVENOUS at 17:03

## 2024-11-05 LAB
ALBUMIN SERPL-MCNC: 2.4 G/DL (ref 3.4–5)
ALP SERPL-CCNC: 43 U/L (ref 45–117)
ALT SERPL-CCNC: 33 U/L (ref 13–61)
ANION GAP SERPL CALC-SCNC: 6 MMOL/L (ref 4–13)
ANISOCYTOSIS BLD QL: 0
AST SERPL-CCNC: 28 U/L (ref 15–37)
BASOPHILS # BLD: 0.5 % (ref 0–2)
BILIRUB SERPL-MCNC: 0.4 MG/DL (ref 0.2–1)
BUN SERPL-MCNC: 11.4 MG/DL (ref 7–18)
CALCIUM SERPL-MCNC: 9.5 MG/DL (ref 8.5–10.1)
CHLORIDE SERPL-SCNC: 105 MMOL/L (ref 98–107)
CO2 SERPL-SCNC: 26 MMOL/L (ref 21–32)
CREAT SERPL-MCNC: 0.9 MG/DL (ref 0.55–1.3)
DEPRECATED RDW RBC AUTO: 14.7 % (ref 11.6–15.6)
EOSINOPHIL # BLD: 4 % (ref 0–4.5)
GLUCOSE SERPL-MCNC: 140 MG/DL (ref 74–106)
HCT VFR BLD CALC: 31.9 % (ref 32.4–45.2)
HGB BLD-MCNC: 10.2 GM/DL (ref 10.7–15.3)
LYMPHOCYTES # BLD: 23.4 % (ref 8–40)
MACROCYTES BLD QL: 0
MAGNESIUM SERPL-MCNC: 1.8 MG/DL (ref 1.8–2.4)
MCH RBC QN AUTO: 26.6 PG (ref 25.7–33.7)
MCHC RBC AUTO-ENTMCNC: 32.1 G/DL (ref 32–36)
MCV RBC: 82.9 FL (ref 80–96)
MONOCYTES # BLD AUTO: 7.8 % (ref 3.8–10.2)
NEUTROPHILS # BLD: 64.3 % (ref 42.8–82.8)
PHOSPHATE SERPL-MCNC: 2.9 MG/DL (ref 2.5–4.9)
PLATELET # BLD AUTO: 256 10^3/UL (ref 134–434)
PMV BLD: 7.8 FL (ref 7.5–11.1)
POTASSIUM SERPLBLD-SCNC: 5.5 MMOL/L (ref 3.5–5.1)
PROT SERPL-MCNC: 5.7 G/DL (ref 6.4–8.2)
RBC # BLD AUTO: 3.85 M/MM3 (ref 3.6–5.2)
SODIUM SERPL-SCNC: 137 MMOL/L (ref 136–145)
WBC # BLD AUTO: 8.8 K/MM3 (ref 4–10)

## 2024-11-05 RX ADMIN — ACETAMINOPHEN SCH MG: 325 TABLET ORAL at 11:15

## 2024-11-06 VITALS — TEMPERATURE: 97.9 F | SYSTOLIC BLOOD PRESSURE: 149 MMHG | DIASTOLIC BLOOD PRESSURE: 69 MMHG | HEART RATE: 64 BPM

## 2024-11-06 VITALS — RESPIRATION RATE: 18 BRPM

## 2024-11-06 LAB
ALBUMIN SERPL-MCNC: 2.5 G/DL (ref 3.4–5)
ALP SERPL-CCNC: 49 U/L (ref 45–117)
ALT SERPL-CCNC: 33 U/L (ref 13–61)
ANION GAP SERPL CALC-SCNC: 4 MMOL/L (ref 4–13)
AST SERPL-CCNC: 24 U/L (ref 15–37)
BASOPHILS # BLD: 0.8 % (ref 0–2)
BILIRUB SERPL-MCNC: 0.3 MG/DL (ref 0.2–1)
BUN SERPL-MCNC: 9.8 MG/DL (ref 7–18)
CALCIUM SERPL-MCNC: 10 MG/DL (ref 8.5–10.1)
CHLORIDE SERPL-SCNC: 107 MMOL/L (ref 98–107)
CO2 SERPL-SCNC: 30 MMOL/L (ref 21–32)
CREAT SERPL-MCNC: 1 MG/DL (ref 0.55–1.3)
DEPRECATED RDW RBC AUTO: 14.6 % (ref 11.6–15.6)
EOSINOPHIL # BLD: 3.8 % (ref 0–4.5)
GLUCOSE SERPL-MCNC: 160 MG/DL (ref 74–106)
HCT VFR BLD CALC: 33.6 % (ref 32.4–45.2)
HGB BLD-MCNC: 10.7 GM/DL (ref 10.7–15.3)
LYMPHOCYTES # BLD: 24.3 % (ref 8–40)
MAGNESIUM SERPL-MCNC: 1.7 MG/DL (ref 1.8–2.4)
MCH RBC QN AUTO: 26.3 PG (ref 25.7–33.7)
MCHC RBC AUTO-ENTMCNC: 31.7 G/DL (ref 32–36)
MCV RBC: 82.7 FL (ref 80–96)
MONOCYTES # BLD AUTO: 7.6 % (ref 3.8–10.2)
NEUTROPHILS # BLD: 63.5 % (ref 42.8–82.8)
PHOSPHATE SERPL-MCNC: 3.6 MG/DL (ref 2.5–4.9)
PLATELET # BLD AUTO: 303 10^3/UL (ref 134–434)
PMV BLD: 7.5 FL (ref 7.5–11.1)
POTASSIUM SERPLBLD-SCNC: 5.3 MMOL/L (ref 3.5–5.1)
PROT SERPL-MCNC: 6 G/DL (ref 6.4–8.2)
RBC # BLD AUTO: 4.06 M/MM3 (ref 3.6–5.2)
SODIUM SERPL-SCNC: 140 MMOL/L (ref 136–145)
WBC # BLD AUTO: 6.1 K/MM3 (ref 4–10)

## 2024-11-06 RX ADMIN — MAGNESIUM SULFATE HEPTAHYDRATE ONE MLS/HR: 40 INJECTION, SOLUTION INTRAVENOUS at 10:01
